# Patient Record
Sex: MALE
[De-identification: names, ages, dates, MRNs, and addresses within clinical notes are randomized per-mention and may not be internally consistent; named-entity substitution may affect disease eponyms.]

---

## 2017-07-26 ENCOUNTER — APPOINTMENT (OUTPATIENT)
Dept: CARDIOLOGY | Facility: CLINIC | Age: 57
End: 2017-07-26
Payer: COMMERCIAL

## 2017-07-26 ENCOUNTER — APPOINTMENT (OUTPATIENT)
Dept: CARDIOLOGY | Facility: CLINIC | Age: 57
End: 2017-07-26

## 2017-07-26 ENCOUNTER — NON-APPOINTMENT (OUTPATIENT)
Age: 57
End: 2017-07-26

## 2017-07-26 VITALS
BODY MASS INDEX: 20.86 KG/M2 | WEIGHT: 154 LBS | RESPIRATION RATE: 16 BRPM | HEIGHT: 72 IN | HEART RATE: 87 BPM | SYSTOLIC BLOOD PRESSURE: 145 MMHG | OXYGEN SATURATION: 100 % | DIASTOLIC BLOOD PRESSURE: 93 MMHG

## 2017-07-26 DIAGNOSIS — N28.9 DISORDER OF KIDNEY AND URETER, UNSPECIFIED: ICD-10-CM

## 2017-07-26 DIAGNOSIS — F17.200 NICOTINE DEPENDENCE, UNSPECIFIED, UNCOMPLICATED: ICD-10-CM

## 2017-07-26 DIAGNOSIS — N20.0 CALCULUS OF KIDNEY: ICD-10-CM

## 2017-07-26 DIAGNOSIS — Z78.9 OTHER SPECIFIED HEALTH STATUS: ICD-10-CM

## 2017-07-26 DIAGNOSIS — M06.9 RHEUMATOID ARTHRITIS, UNSPECIFIED: ICD-10-CM

## 2017-07-26 DIAGNOSIS — I10 ESSENTIAL (PRIMARY) HYPERTENSION: ICD-10-CM

## 2017-07-26 DIAGNOSIS — I71.9 AORTIC ANEURYSM OF UNSPECIFIED SITE, W/OUT RUPTURE: ICD-10-CM

## 2017-07-26 DIAGNOSIS — I49.3 VENTRICULAR PREMATURE DEPOLARIZATION: ICD-10-CM

## 2017-07-26 DIAGNOSIS — F15.90 OTHER STIMULANT USE, UNSPECIFIED, UNCOMPLICATED: ICD-10-CM

## 2017-07-26 DIAGNOSIS — M19.90 UNSPECIFIED OSTEOARTHRITIS, UNSPECIFIED SITE: ICD-10-CM

## 2017-07-26 PROCEDURE — 93306 TTE W/DOPPLER COMPLETE: CPT

## 2017-07-26 RX ORDER — ALPRAZOLAM 0.25 MG/1
0.25 TABLET ORAL
Qty: 30 | Refills: 0 | Status: DISCONTINUED | COMMUNITY
Start: 2017-03-20

## 2017-07-26 RX ORDER — ETANERCEPT 50 MG/ML
50 SOLUTION SUBCUTANEOUS
Refills: 0 | Status: ACTIVE | COMMUNITY

## 2017-07-26 RX ORDER — MESALAMINE 1000 MG/1
1000 SUPPOSITORY RECTAL
Qty: 60 | Refills: 0 | Status: DISCONTINUED | COMMUNITY
Start: 2017-03-07

## 2017-07-26 RX ORDER — LEVOFLOXACIN 750 MG/1
750 TABLET, FILM COATED ORAL
Qty: 14 | Refills: 0 | Status: DISCONTINUED | COMMUNITY
Start: 2017-03-28

## 2017-07-31 ENCOUNTER — NON-APPOINTMENT (OUTPATIENT)
Age: 57
End: 2017-07-31

## 2018-02-07 ENCOUNTER — APPOINTMENT (OUTPATIENT)
Dept: CARDIOLOGY | Facility: CLINIC | Age: 58
End: 2018-02-07
Payer: COMMERCIAL

## 2018-02-07 PROCEDURE — 93015 CV STRESS TEST SUPVJ I&R: CPT

## 2018-07-24 PROBLEM — Z78.9 ALCOHOL USE: Status: INACTIVE | Noted: 2017-07-26

## 2020-01-22 ENCOUNTER — NON-APPOINTMENT (OUTPATIENT)
Age: 60
End: 2020-01-22

## 2020-01-22 ENCOUNTER — APPOINTMENT (OUTPATIENT)
Dept: CARDIOLOGY | Facility: CLINIC | Age: 60
End: 2020-01-22
Payer: COMMERCIAL

## 2020-01-22 VITALS
SYSTOLIC BLOOD PRESSURE: 133 MMHG | OXYGEN SATURATION: 98 % | BODY MASS INDEX: 21.26 KG/M2 | HEART RATE: 64 BPM | HEIGHT: 72 IN | RESPIRATION RATE: 16 BRPM | DIASTOLIC BLOOD PRESSURE: 88 MMHG | WEIGHT: 157 LBS

## 2020-01-22 PROCEDURE — 99213 OFFICE O/P EST LOW 20 MIN: CPT

## 2020-01-22 PROCEDURE — 93000 ELECTROCARDIOGRAM COMPLETE: CPT

## 2020-01-22 RX ORDER — LABETALOL HYDROCHLORIDE 100 MG/1
100 TABLET, FILM COATED ORAL
Qty: 60 | Refills: 3 | Status: ACTIVE | COMMUNITY
Start: 2017-07-26 | End: 1900-01-01

## 2020-01-26 NOTE — ASSESSMENT
[FreeTextEntry1] : abnormal cardiac CTA\par will update his ischemic risk with plain stress testing \par \par hyperlipidemia\par fasting lipids fasting lipids are requested in order to update his lipid risk with an eye towards continued statins \par \par aortic ectasia \par echo . would favor labetalol as an antihypertensive in view of borderline aortic dilatation in the setting Ankylosing spondylitis.\par \par medical necessity\par moderate risk encounter based upon evaluation of multiple stable cardiovascular issues

## 2020-01-26 NOTE — REASON FOR VISIT
[Follow-Up - Clinic] : a clinic follow-up of [Coronary Artery Disease] : coronary artery disease [Hyperlipidemia] : hyperlipidemia [Hypertension] : hypertension [Medication Management] : Medication management [Peripheral Vascular Disease] : peripheral vascular disease [FreeTextEntry1] : Dr Riley comes today for follow-up of aortic ectasia,  hyperlipidemia and a mildly abnormal cardiac CAT scan

## 2020-03-11 ENCOUNTER — APPOINTMENT (OUTPATIENT)
Dept: CARDIOLOGY | Facility: CLINIC | Age: 60
End: 2020-03-11
Payer: COMMERCIAL

## 2020-03-11 PROCEDURE — 93306 TTE W/DOPPLER COMPLETE: CPT

## 2020-03-11 PROCEDURE — 93015 CV STRESS TEST SUPVJ I&R: CPT

## 2020-03-13 ENCOUNTER — EMERGENCY (EMERGENCY)
Facility: HOSPITAL | Age: 60
LOS: 1 days | Discharge: HOME | End: 2020-03-13
Attending: EMERGENCY MEDICINE | Admitting: EMERGENCY MEDICINE
Payer: COMMERCIAL

## 2020-03-13 VITALS
OXYGEN SATURATION: 96 % | HEART RATE: 68 BPM | SYSTOLIC BLOOD PRESSURE: 142 MMHG | TEMPERATURE: 98 F | DIASTOLIC BLOOD PRESSURE: 89 MMHG | RESPIRATION RATE: 17 BRPM

## 2020-03-13 VITALS
SYSTOLIC BLOOD PRESSURE: 168 MMHG | WEIGHT: 154.98 LBS | TEMPERATURE: 98 F | OXYGEN SATURATION: 95 % | HEIGHT: 72 IN | RESPIRATION RATE: 18 BRPM | DIASTOLIC BLOOD PRESSURE: 117 MMHG | HEART RATE: 88 BPM

## 2020-03-13 DIAGNOSIS — R61 GENERALIZED HYPERHIDROSIS: ICD-10-CM

## 2020-03-13 LAB
ALBUMIN SERPL ELPH-MCNC: 4 G/DL — SIGNIFICANT CHANGE UP (ref 3.3–5)
ALP SERPL-CCNC: 66 U/L — SIGNIFICANT CHANGE UP (ref 40–120)
ALT FLD-CCNC: 23 U/L — SIGNIFICANT CHANGE UP (ref 10–45)
ANION GAP SERPL CALC-SCNC: 8 MMOL/L — SIGNIFICANT CHANGE UP (ref 5–17)
APPEARANCE UR: CLEAR — SIGNIFICANT CHANGE UP
AST SERPL-CCNC: 18 U/L — SIGNIFICANT CHANGE UP (ref 10–40)
BASOPHILS # BLD AUTO: 0.02 K/UL — SIGNIFICANT CHANGE UP (ref 0–0.2)
BASOPHILS NFR BLD AUTO: 0.2 % — SIGNIFICANT CHANGE UP (ref 0–2)
BILIRUB SERPL-MCNC: 0.4 MG/DL — SIGNIFICANT CHANGE UP (ref 0.2–1.2)
BILIRUB UR-MCNC: NEGATIVE — SIGNIFICANT CHANGE UP
BUN SERPL-MCNC: 22 MG/DL — SIGNIFICANT CHANGE UP (ref 7–23)
CALCIUM SERPL-MCNC: 9.2 MG/DL — SIGNIFICANT CHANGE UP (ref 8.4–10.5)
CHLORIDE SERPL-SCNC: 104 MMOL/L — SIGNIFICANT CHANGE UP (ref 96–108)
CO2 SERPL-SCNC: 30 MMOL/L — SIGNIFICANT CHANGE UP (ref 22–31)
COLOR SPEC: YELLOW — SIGNIFICANT CHANGE UP
CREAT SERPL-MCNC: 1.02 MG/DL — SIGNIFICANT CHANGE UP (ref 0.5–1.3)
DIFF PNL FLD: NEGATIVE — SIGNIFICANT CHANGE UP
EOSINOPHIL # BLD AUTO: 0.19 K/UL — SIGNIFICANT CHANGE UP (ref 0–0.5)
EOSINOPHIL NFR BLD AUTO: 2.3 % — SIGNIFICANT CHANGE UP (ref 0–6)
FLU A RESULT: SIGNIFICANT CHANGE UP
FLU A RESULT: SIGNIFICANT CHANGE UP
FLUAV AG NPH QL: SIGNIFICANT CHANGE UP
FLUBV AG NPH QL: SIGNIFICANT CHANGE UP
GLUCOSE SERPL-MCNC: 115 MG/DL — HIGH (ref 70–99)
GLUCOSE UR QL: NEGATIVE — SIGNIFICANT CHANGE UP
HCT VFR BLD CALC: 38 % — LOW (ref 39–50)
HGB BLD-MCNC: 12.9 G/DL — LOW (ref 13–17)
IMM GRANULOCYTES NFR BLD AUTO: 0.4 % — SIGNIFICANT CHANGE UP (ref 0–1.5)
KETONES UR-MCNC: NEGATIVE — SIGNIFICANT CHANGE UP
LEUKOCYTE ESTERASE UR-ACNC: NEGATIVE — SIGNIFICANT CHANGE UP
LYMPHOCYTES # BLD AUTO: 0.89 K/UL — LOW (ref 1–3.3)
LYMPHOCYTES # BLD AUTO: 10.6 % — LOW (ref 13–44)
MAGNESIUM SERPL-MCNC: 2.3 MG/DL — SIGNIFICANT CHANGE UP (ref 1.6–2.6)
MCHC RBC-ENTMCNC: 29.9 PG — SIGNIFICANT CHANGE UP (ref 27–34)
MCHC RBC-ENTMCNC: 33.9 GM/DL — SIGNIFICANT CHANGE UP (ref 32–36)
MCV RBC AUTO: 88 FL — SIGNIFICANT CHANGE UP (ref 80–100)
MONOCYTES # BLD AUTO: 0.49 K/UL — SIGNIFICANT CHANGE UP (ref 0–0.9)
MONOCYTES NFR BLD AUTO: 5.8 % — SIGNIFICANT CHANGE UP (ref 2–14)
NEUTROPHILS # BLD AUTO: 6.76 K/UL — SIGNIFICANT CHANGE UP (ref 1.8–7.4)
NEUTROPHILS NFR BLD AUTO: 80.7 % — HIGH (ref 43–77)
NITRITE UR-MCNC: NEGATIVE — SIGNIFICANT CHANGE UP
NRBC # BLD: 0 /100 WBCS — SIGNIFICANT CHANGE UP (ref 0–0)
PH UR: 7 — SIGNIFICANT CHANGE UP (ref 5–8)
PLATELET # BLD AUTO: 190 K/UL — SIGNIFICANT CHANGE UP (ref 150–400)
POTASSIUM SERPL-MCNC: 4.3 MMOL/L — SIGNIFICANT CHANGE UP (ref 3.5–5.3)
POTASSIUM SERPL-SCNC: 4.3 MMOL/L — SIGNIFICANT CHANGE UP (ref 3.5–5.3)
PROT SERPL-MCNC: 7.5 G/DL — SIGNIFICANT CHANGE UP (ref 6–8.3)
PROT UR-MCNC: NEGATIVE — SIGNIFICANT CHANGE UP
RBC # BLD: 4.32 M/UL — SIGNIFICANT CHANGE UP (ref 4.2–5.8)
RBC # FLD: 13.2 % — SIGNIFICANT CHANGE UP (ref 10.3–14.5)
RSV RESULT: SIGNIFICANT CHANGE UP
RSV RNA RESP QL NAA+PROBE: SIGNIFICANT CHANGE UP
SODIUM SERPL-SCNC: 142 MMOL/L — SIGNIFICANT CHANGE UP (ref 135–145)
SP GR SPEC: 1.01 — SIGNIFICANT CHANGE UP (ref 1.01–1.02)
UROBILINOGEN FLD QL: NEGATIVE — SIGNIFICANT CHANGE UP
WBC # BLD: 8.38 K/UL — SIGNIFICANT CHANGE UP (ref 3.8–10.5)
WBC # FLD AUTO: 8.38 K/UL — SIGNIFICANT CHANGE UP (ref 3.8–10.5)

## 2020-03-13 PROCEDURE — 99283 EMERGENCY DEPT VISIT LOW MDM: CPT | Mod: 25

## 2020-03-13 PROCEDURE — 87581 M.PNEUMON DNA AMP PROBE: CPT

## 2020-03-13 PROCEDURE — 36415 COLL VENOUS BLD VENIPUNCTURE: CPT

## 2020-03-13 PROCEDURE — 87486 CHLMYD PNEUM DNA AMP PROBE: CPT

## 2020-03-13 PROCEDURE — 83735 ASSAY OF MAGNESIUM: CPT

## 2020-03-13 PROCEDURE — 80053 COMPREHEN METABOLIC PANEL: CPT

## 2020-03-13 PROCEDURE — 85027 COMPLETE CBC AUTOMATED: CPT

## 2020-03-13 PROCEDURE — 71046 X-RAY EXAM CHEST 2 VIEWS: CPT | Mod: 26

## 2020-03-13 PROCEDURE — 87633 RESP VIRUS 12-25 TARGETS: CPT

## 2020-03-13 PROCEDURE — 87798 DETECT AGENT NOS DNA AMP: CPT

## 2020-03-13 PROCEDURE — 93010 ELECTROCARDIOGRAM REPORT: CPT

## 2020-03-13 PROCEDURE — 99284 EMERGENCY DEPT VISIT MOD MDM: CPT

## 2020-03-13 PROCEDURE — 71046 X-RAY EXAM CHEST 2 VIEWS: CPT

## 2020-03-13 PROCEDURE — 87631 RESP VIRUS 3-5 TARGETS: CPT

## 2020-03-13 PROCEDURE — 93005 ELECTROCARDIOGRAM TRACING: CPT

## 2020-03-13 RX ORDER — SODIUM CHLORIDE 9 MG/ML
1000 INJECTION INTRAMUSCULAR; INTRAVENOUS; SUBCUTANEOUS ONCE
Refills: 0 | Status: COMPLETED | OUTPATIENT
Start: 2020-03-13 | End: 2020-03-13

## 2020-03-13 RX ADMIN — SODIUM CHLORIDE 1000 MILLILITER(S): 9 INJECTION INTRAMUSCULAR; INTRAVENOUS; SUBCUTANEOUS at 08:44

## 2020-03-13 NOTE — ED PROVIDER NOTE - ADDITIONAL NOTES AND INSTRUCTIONS:
You may return to work when fever free for at least 24 hours without the use of Ibuprofen or Acetaminophen. Thank you.

## 2020-03-13 NOTE — ED PROVIDER NOTE - OBJECTIVE STATEMENT
60M with PMH of HTN and Ankylosing Spondylitis (on Enbrel) presents to the ED with reports of subjective fevers and diaphoresis overnight. Patient notes that while asleep, he broke into a profuse sweat that soaked his sheets. He didn't take his temperature but this AM, he got up to come in to work, wore a mask, and it happened again, where he broke into a flushed feeling and sweat. No cough or flu like symptoms currently. No chest pain or SOB. No abd pain, nausea, vomiting or diarrhea. He notes that about 1 week ago, he had flu-like symptoms. He self medicated with Levaquin. He had a cough during that time but it has since resolved. He also stopped his Enbrel due to the flu-like illness he was experiencing. He saw his cardiologist, where an echo was performed early this week and deemed normal. He is on Labetalol, which he took this AM.   In light of recent events, pt has no known exposure to patient who has been positive for COVID-19. He has no current respiratory symptoms. He has no recent travel to the [place].

## 2020-03-13 NOTE — ED PROVIDER NOTE - PATIENT PORTAL LINK FT
You can access the FollowMyHealth Patient Portal offered by [facility] by registering at the following website: [email or web address]. By joining [facility]’s FollowMyHealth portal, you will also be able to view your health information using other applications (apps) compatible with our system.

## 2020-03-13 NOTE — ED PROVIDER NOTE - CARE PROVIDER_API CALL
Ross Alves (DO)  Internal Medicine  [address] [id number]  Phone: [phone]  Fax: [phone]  Follow Up Time:

## 2020-03-13 NOTE — ED ADULT NURSE NOTE - OBJECTIVE STATEMENT
60 yr old male A&OX3 ambulatory to ED c/o sudden onset of diaphoresis/chills. Pt denies travel, shortness of breath, chest pain. fever, nausea, vomiting, cough, diarrhea. Pt afebrile, lungs clear bilaterally. PMHX: Kidney stones, HTN

## 2020-03-13 NOTE — ED PROVIDER NOTE - CLINICAL SUMMARY MEDICAL DECISION MAKING FREE TEXT BOX
60M with PMH of HTN and Ankylosing Spondylitis (on Enbrel) presents to the ED with reports of subjective fevers and diaphoresis overnight. Patient notes that while asleep, he broke into a profuse sweat that soaked his sheets. He didn't take his temperature but this AM, he got up to come in to work, wore a mask, and it happened again, where he broke into a flushed feeling and sweat. No cough or flu like symptoms currently. No chest pain or SOB. No abd pain, nausea, vomiting or diarrhea. He notes that about 1 week ago, he had flu-like symptoms. He self medicated with Levaquin. He had a cough during that time but it has since resolved. He also stopped his Enbrel due to the flu-like illness he was experiencing. He saw his cardiologist, where an echo was performed early this week and deemed normal. He is on Labetalol, which he took this AM.   In light of recent events, pt has no known exposure to patient who has been positive for COVID-19. He has no current respiratory symptoms. He has no recent travel to the [place] of interest.   Exam WNL. Will check EKG, Monitor BP, Check labs (CBC CMP), UA (h/o stones), and Sent Flu and RVP. I will complete [employer] Tracking tool, although no known exposure due to occupation and chance of unknown exposure. At this time, pt does not meet criteria for covid testing and we discussed this at length. All questions answered. 60M with PMH of HTN and Ankylosing Spondylitis (on Enbrel) presents to the ED with reports of subjective fevers and diaphoresis overnight. Patient notes that while asleep, he broke into a profuse sweat that soaked his sheets. He didn't take his temperature but this AM, he got up to come in to work, wore a mask, and it happened again, where he broke into a flushed feeling and sweat. No cough or flu like symptoms currently. No chest pain or SOB. No abd pain, nausea, vomiting or diarrhea. He notes that about 1 week ago, he had flu-like symptoms. He self medicated with Levaquin. He had a cough during that time but it has since resolved. He also stopped his Enbrel due to the flu-like illness he was experiencing. He saw his cardiologist, where an echo was performed early this week and deemed normal. He is on Labetalol, which he took this AM.   In light of recent events, pt has no known exposure to patient who has been positive for COVID-19. He has no current respiratory symptoms. He has no recent travel to the [place].   Exam WNL. Will check EKG, Monitor BP, Check labs (CBC CMP), UA (h/o stones), and Sent Flu and RVP. I will complete [employer] Tracking tool, although no known exposure due to occupation and chance of unknown exposure. At this time, pt does not meet criteria for covid testing and we discussed this at length. All questions answered.  All results WNL. Pt states he feels 100% improved. Vitals normal. Stable for D/C. Informed of outstanding test (RVP) that will be pending post discharge. Advised that if Worsening, continued or ANY new concerning symptoms return to the emergency department.

## 2020-03-13 NOTE — ED PROVIDER NOTE - PMH
Ankylosing spondylitis, unspecified site of spine    Hypertension, unspecified type    Kidney stones

## 2020-03-13 NOTE — ED PROVIDER NOTE - NSFOLLOWUPINSTRUCTIONS_ED_ALL_ED_FT
During your stay, you asked if we offer the test for COVID-19. Hopefully, we answered your question adequately. If further questions come up, see the attached fact sheet. Also, [facility] has set up a call center to answer questions. The phone number is [phone] - Press Option #3 for patients.     We have tested for Influenza A, B, and RSV, which are negative as resulted today. We have sent a Respiratory Viral Panel which is a more in depth testing for viral illnesses such as the usual strains of influenza, rhinovirus, enterovirus, parainfluenza, coronavirus and more. If any such result were to return positive, we would call you to inform you of the finding. Also, we have entered your info and presentation into the [facility] tracking database called [employer] for patient tracking in case any new developments arise and/or we need to contact you.     Meanwhile, since you have only symptom of feeling feverish, it is advised that you monitor your temperature and observe for progression of symptoms. These symptoms may be urinary, respiratory, cardiac, GI, etc. Worsening, continued or ANY new concerning symptoms return to the emergency department.     You should remain home from work until you are fever free for at least 24 hours. This is standard. If there are any additional concerns, contact your primary physician.    Drink plenty of fluids. Rest. Take Tylenol 650mg every 6 hours or Ibuprofen 600mg every 6 hours as needed for fever or pain.       Thank you.

## 2020-03-14 LAB — RAPID RVP RESULT: SIGNIFICANT CHANGE UP

## 2020-03-14 RX ORDER — SIMVASTATIN 5 MG/1
5 TABLET, FILM COATED ORAL
Refills: 0 | Status: DISCONTINUED | COMMUNITY
End: 2020-03-14

## 2020-03-14 RX ORDER — NEBIVOLOL HYDROCHLORIDE 10 MG/1
10 TABLET ORAL
Qty: 30 | Refills: 2 | Status: DISCONTINUED | COMMUNITY
Start: 2018-02-02 | End: 2020-03-14

## 2020-03-17 ENCOUNTER — OUTPATIENT (OUTPATIENT)
Dept: OUTPATIENT SERVICES | Facility: HOSPITAL | Age: 60
LOS: 1 days | End: 2020-03-17
Payer: COMMERCIAL

## 2020-03-17 ENCOUNTER — APPOINTMENT (OUTPATIENT)
Dept: CT IMAGING | Facility: HOSPITAL | Age: 60
End: 2020-03-17
Payer: COMMERCIAL

## 2020-03-17 DIAGNOSIS — Z00.8 ENCOUNTER FOR OTHER GENERAL EXAMINATION: ICD-10-CM

## 2020-03-17 PROBLEM — N20.0 CALCULUS OF KIDNEY: Chronic | Status: ACTIVE | Noted: 2020-03-13

## 2020-03-17 PROBLEM — I10 ESSENTIAL (PRIMARY) HYPERTENSION: Chronic | Status: ACTIVE | Noted: 2020-03-13

## 2020-03-17 PROBLEM — M45.9 ANKYLOSING SPONDYLITIS OF UNSPECIFIED SITES IN SPINE: Chronic | Status: ACTIVE | Noted: 2020-03-13

## 2020-03-17 PROCEDURE — 71260 CT THORAX DX C+: CPT | Mod: 26

## 2020-03-17 PROCEDURE — 71260 CT THORAX DX C+: CPT

## 2020-03-17 PROCEDURE — 74177 CT ABD & PELVIS W/CONTRAST: CPT

## 2020-03-17 PROCEDURE — 74177 CT ABD & PELVIS W/CONTRAST: CPT | Mod: 26

## 2020-03-25 ENCOUNTER — APPOINTMENT (OUTPATIENT)
Dept: ALLERGY | Facility: CLINIC | Age: 60
End: 2020-03-25
Payer: COMMERCIAL

## 2020-03-25 VITALS
BODY MASS INDEX: 21.26 KG/M2 | DIASTOLIC BLOOD PRESSURE: 84 MMHG | WEIGHT: 157 LBS | OXYGEN SATURATION: 98 % | HEART RATE: 80 BPM | SYSTOLIC BLOOD PRESSURE: 132 MMHG | HEIGHT: 72 IN

## 2020-03-25 PROCEDURE — 99204 OFFICE O/P NEW MOD 45 MIN: CPT

## 2020-03-25 RX ORDER — FINASTERIDE 5 MG/1
TABLET, FILM COATED ORAL
Refills: 0 | Status: DISCONTINUED | COMMUNITY
End: 2020-03-25

## 2020-03-25 NOTE — HISTORY OF PRESENT ILLNESS
[Asthma] : asthma [Allergic Rhinitis] : allergic rhinitis [Eczematous rashes] : eczematous rashes [Venom Reactions] : venom reactions [Food Allergies] : food allergies [de-identified] : Patient has history of ankylosis spondylitis diagnosed over 30 years ago - treated with Enbrel x 10 years and discontinued about one month ago.   About 2 weeks ago he was experiencing night sweats - with no associated chills - loss of appetite or weight loss.  He had negative flu testing.   He then had a CT of chest, abdomen and pelvis which was normal.   His sweating has lessened then it was at his peak.   He had a negative PPD performed 6 weeks ago.   He had additional blood work for TB.    No associated fever or cough.  \par \par He recently had QUIGs performed because of his concern about lymphoma.   He has URI 3x per years - clears without treatment.   CXR documented pneumonia in 2018 - treated as an outpatient - cleared on follow up CXR.   \par \par Genetic markers are negative for ankylosis spondylitis.   Mother/brother hypothyroid.   Three children - no autoimmune problems.

## 2020-03-25 NOTE — PHYSICAL EXAM
[Alert] : alert [Well Nourished] : well nourished [Healthy Appearance] : healthy appearance [No Acute Distress] : no acute distress [Well Developed] : well developed [Normal Pupil & Iris Size/Symmetry] : normal pupil and iris size and symmetry [No Discharge] : no discharge [No Photophobia] : no photophobia [Sclera Not Icteric] : sclera not icteric [Normal Lips/Tongue] : the lips and tongue were normal [Normal Tonsils] : normal tonsils [No Oral Lesions or Ulcers] : no oral lesions or ulcers [Boggy Nasal Turbinates] : no boggy and/or pale nasal turbinates [No Neck Mass] : no neck mass was observed [No LAD] : no lymphadenopathy [No Thyroid Mass] : no thyroid mass [Supple] : the neck was supple [Normal Rate and Effort] : normal respiratory rhythm and effort [Normal Palpation] : palpation of the chest revealed no abnormalities [No Crackles] : no crackles [Bilateral Audible Breath Sounds] : bilateral audible breath sounds [Wheezing] : no wheezing was heard [Normal Rate] : heart rate was normal  [Normal S1, S2] : normal S1 and S2 [No murmur] : no murmur [Regular Rhythm] : with a regular rhythm [Normal Cervical Lymph Nodes] : cervical [Skin Intact] : skin intact  [No Rash] : no rash [No Skin Lesions] : no skin lesions [No Joint Swelling or Erythema] : no joint swelling or erythema [No clubbing] : no clubbing [No Edema] : no edema [No Cyanosis] : no cyanosis [Normal Mood] : mood was normal [Normal Affect] : affect was normal [Alert, Awake, Oriented as Age-Appropriate] : alert, awake, oriented as age appropriate

## 2020-03-25 NOTE — ASSESSMENT
[FreeTextEntry1] : IgA deficiency \par Ankylosis spondylitis\par \par I have advised Dr. Riley that the frequency of IgA deficiency is 1/600 and may associated with other immunodeficiencies therefore further laboratory testing is necessary.   In addition, there is an association with autoimmune disorders and further testing will be performed as well.

## 2020-03-25 NOTE — SOCIAL HISTORY
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

## 2020-03-26 LAB
CHOLEST SERPL-MCNC: 204 MG/DL
CHOLEST/HDLC SERPL: 4.3 RATIO
CK SERPL-CCNC: 42 U/L
HDLC SERPL-MCNC: 47 MG/DL
LDLC SERPL CALC-MCNC: 141 MG/DL
TRIGL SERPL-MCNC: 79 MG/DL

## 2020-03-27 LAB
ALBUMIN SERPL ELPH-MCNC: 5 G/DL
ALP BLD-CCNC: 73 U/L
ALT SERPL-CCNC: 12 U/L
ANION GAP SERPL CALC-SCNC: 15 MMOL/L
AST SERPL-CCNC: 15 U/L
BILIRUB SERPL-MCNC: 0.6 MG/DL
BUN SERPL-MCNC: 13 MG/DL
CALCIUM SERPL-MCNC: 10 MG/DL
CHLORIDE SERPL-SCNC: 96 MMOL/L
CO2 SERPL-SCNC: 29 MMOL/L
CREAT SERPL-MCNC: 0.9 MG/DL
GLUCOSE SERPL-MCNC: 118 MG/DL
POTASSIUM SERPL-SCNC: 4.4 MMOL/L
PROT SERPL-MCNC: 7.7 G/DL
SODIUM SERPL-SCNC: 140 MMOL/L

## 2020-03-31 DIAGNOSIS — R61 GENERALIZED HYPERHIDROSIS: ICD-10-CM

## 2020-04-04 LAB
ANA SER IF-ACNC: NEGATIVE
C DIPHTHERIAE AB SER QL: 0.43 IU/ML
C TETANI IGG SER-ACNC: 1.52 IU/ML
CD16+CD56+ CELLS # BLD: 181 /UL
CD16+CD56+ CELLS NFR BLD: 19 %
CD19 CELLS NFR BLD: 170 /UL
CD3 CELLS # BLD: 596 /UL
CD3 CELLS NFR BLD: 64 %
CD3+CD4+ CELLS # BLD: 367 /UL
CD3+CD4+ CELLS NFR BLD: 41 %
CD3+CD4+ CELLS/CD3+CD8+ CLL SPEC: 2.77 RATIO
CD3+CD8+ CELLS # SPEC: 132 /UL
CD3+CD8+ CELLS NFR BLD: 15 %
CELLS.CD3-CD19+/CELLS IN BLOOD: 18 %
CRP SERPL-MCNC: <0.1 MG/DL
DEPRECATED S PNEUM 1 IGG SER-MCNC: 17.2 MCG/ML
DEPRECATED S PNEUM12 AB SER-ACNC: <0.4 MCG/ML
DEPRECATED S PNEUM14 AB SER-ACNC: 1 MCG/ML
DEPRECATED S PNEUM17 IGG SER IA-MCNC: 0.8 MCG/ML
DEPRECATED S PNEUM18 IGG SER IA-MCNC: 0.4 MCG/ML
DEPRECATED S PNEUM19 IGG SER-MCNC: 11.5 MCG/ML
DEPRECATED S PNEUM19 IGG SER-MCNC: 21.9 MCG/ML
DEPRECATED S PNEUM2 IGG SER-MCNC: 0.9 MCG/ML
DEPRECATED S PNEUM20 IGG SER-MCNC: 1.7 MCG/ML
DEPRECATED S PNEUM22 IGG SER-MCNC: 17.5 MCG/ML
DEPRECATED S PNEUM23 AB SER-ACNC: 19 MCG/ML
DEPRECATED S PNEUM3 AB SER-ACNC: <0.4 MCG/ML
DEPRECATED S PNEUM34 IGG SER-MCNC: 0.9 MCG/ML
DEPRECATED S PNEUM4 AB SER-ACNC: <0.4 MCG/ML
DEPRECATED S PNEUM5 IGG SER-MCNC: 4.8 MCG/ML
DEPRECATED S PNEUM6 IGG SER-MCNC: 5 MCG/ML
DEPRECATED S PNEUM7 IGG SER-ACNC: 9.9 MCG/ML
DEPRECATED S PNEUM8 AB SER-ACNC: 0.4 MCG/ML
DEPRECATED S PNEUM9 AB SER-ACNC: 0.5 MCG/ML
DEPRECATED S PNEUM9 IGG SER-MCNC: 1 MCG/ML
IGG SUBSET TOTAL IGG: 1337 MG/DL
IGG1 SER-MCNC: 736 MG/DL
IGG2 SER-MCNC: 497 MG/DL
IGG3 SER-MCNC: 118 MG/DL
IGG4 SER-MCNC: 2 MG/DL
RHEUMATOID FACT SER QL: <10 IU/ML
STREPTOCOCCUS PNEUMONIAE SEROTYPE 11A: 0.7 MCG/ML
STREPTOCOCCUS PNEUMONIAE SEROTYPE 15B: 1.4 MCG/ML
STREPTOCOCCUS PNEUMONIAE SEROTYPE 33F: 1.1 MCG/ML

## 2020-04-08 ENCOUNTER — APPOINTMENT (OUTPATIENT)
Dept: ALLERGY | Facility: CLINIC | Age: 60
End: 2020-04-08
Payer: COMMERCIAL

## 2020-04-08 PROCEDURE — 99213 OFFICE O/P EST LOW 20 MIN: CPT | Mod: 95

## 2020-04-09 NOTE — DATA REVIEWED
[FreeTextEntry1] : I reviewed lab data with patient and advised him that he has protective antibodies directed towards DT and 10/23 pneumococcal serotypes.

## 2020-04-09 NOTE — HISTORY OF PRESENT ILLNESS
[Home] : at home, [unfilled] , at the time of the visit. [Medical Office: ([facility])___] : at ~his/her~ medical office located in [place] [Patient] : the patient [FreeTextEntry2] : Justin Riley  [de-identified] : Patient had tested positive to corona virus and ID felt this would explain his night sweats - his symptoms have lessened slightly since the onset - no associated fever or coughing.

## 2020-04-09 NOTE — REASON FOR VISIT
[Routine Follow-Up] : a routine follow-up visit for [FreeTextEntry3] : follow up of immunodeficiency and lab review

## 2020-04-09 NOTE — ASSESSMENT
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 \par \par No treatment necessary at this time\par IgA deficient donor blood transfusion if indicated in the future\par \par Signs and symptoms of corona virus - \par \par Patient will follow up with PCP regarding persistent night sweats \par \par This visit was provided via telehealth using real time 2-way audio visual technology.  The patient, Justin Riley , was located at home, at the time of the visit.   The provider, Mitchell Boxer, M.D., was located at the office, [address] [place] [address], at the time of the visit.\par \par The patient, Justin Riley and physician, Mitchell Boxer, M.D., participated in the telehealth encounter.\par \par Verbal consent obtained by  from patient.\par \par The majority of time (>50%) was spent on counseling and coordination of care with this patient and/or family member.  The approximate physician face to face time was 15 minutes for the diagnosis of IgA deficiency. \par \par

## 2020-04-13 LAB — C2 SERPL-MCNC: 2.4 MG/DL

## 2022-01-31 ENCOUNTER — APPOINTMENT (OUTPATIENT)
Dept: NEUROLOGY | Facility: CLINIC | Age: 62
End: 2022-01-31
Payer: COMMERCIAL

## 2022-01-31 ENCOUNTER — EMERGENCY (EMERGENCY)
Facility: HOSPITAL | Age: 62
LOS: 1 days | Discharge: HOME | End: 2022-01-31
Attending: EMERGENCY MEDICINE | Admitting: EMERGENCY MEDICINE
Payer: COMMERCIAL

## 2022-01-31 VITALS
HEART RATE: 76 BPM | SYSTOLIC BLOOD PRESSURE: 128 MMHG | BODY MASS INDEX: 20.32 KG/M2 | HEIGHT: 72 IN | DIASTOLIC BLOOD PRESSURE: 76 MMHG | WEIGHT: 150 LBS

## 2022-01-31 VITALS
DIASTOLIC BLOOD PRESSURE: 90 MMHG | RESPIRATION RATE: 16 BRPM | OXYGEN SATURATION: 100 % | HEART RATE: 67 BPM | SYSTOLIC BLOOD PRESSURE: 137 MMHG

## 2022-01-31 VITALS
RESPIRATION RATE: 18 BRPM | HEART RATE: 72 BPM | SYSTOLIC BLOOD PRESSURE: 161 MMHG | HEIGHT: 72 IN | TEMPERATURE: 97 F | WEIGHT: 127.87 LBS | DIASTOLIC BLOOD PRESSURE: 99 MMHG | OXYGEN SATURATION: 98 %

## 2022-01-31 VITALS
BODY MASS INDEX: 20.32 KG/M2 | DIASTOLIC BLOOD PRESSURE: 76 MMHG | SYSTOLIC BLOOD PRESSURE: 128 MMHG | HEART RATE: 76 BPM | HEIGHT: 72 IN | WEIGHT: 150 LBS

## 2022-01-31 DIAGNOSIS — Z87.898 PERSONAL HISTORY OF OTHER SPECIFIED CONDITIONS: ICD-10-CM

## 2022-01-31 DIAGNOSIS — R26.89 OTHER ABNORMALITIES OF GAIT AND MOBILITY: ICD-10-CM

## 2022-01-31 DIAGNOSIS — R42 DIZZINESS AND GIDDINESS: ICD-10-CM

## 2022-01-31 LAB
ALBUMIN SERPL ELPH-MCNC: 4.1 G/DL — SIGNIFICANT CHANGE UP (ref 3.3–5)
ALP SERPL-CCNC: 70 U/L — SIGNIFICANT CHANGE UP (ref 40–120)
ALT FLD-CCNC: 23 U/L — SIGNIFICANT CHANGE UP (ref 10–45)
ANION GAP SERPL CALC-SCNC: 5 MMOL/L — SIGNIFICANT CHANGE UP (ref 5–17)
APPEARANCE UR: CLEAR — SIGNIFICANT CHANGE UP
APTT BLD: 21.8 SEC — LOW (ref 27.5–35.5)
AST SERPL-CCNC: 21 U/L — SIGNIFICANT CHANGE UP (ref 10–40)
BACTERIA # UR AUTO: NEGATIVE /HPF — SIGNIFICANT CHANGE UP
BASOPHILS # BLD AUTO: 0.03 K/UL — SIGNIFICANT CHANGE UP (ref 0–0.2)
BASOPHILS NFR BLD AUTO: 0.4 % — SIGNIFICANT CHANGE UP (ref 0–2)
BILIRUB SERPL-MCNC: 0.7 MG/DL — SIGNIFICANT CHANGE UP (ref 0.2–1.2)
BILIRUB UR-MCNC: NEGATIVE — SIGNIFICANT CHANGE UP
BUN SERPL-MCNC: 17 MG/DL — SIGNIFICANT CHANGE UP (ref 7–23)
CALCIUM SERPL-MCNC: 9 MG/DL — SIGNIFICANT CHANGE UP (ref 8.4–10.5)
CHLORIDE SERPL-SCNC: 102 MMOL/L — SIGNIFICANT CHANGE UP (ref 96–108)
CO2 SERPL-SCNC: 30 MMOL/L — SIGNIFICANT CHANGE UP (ref 22–31)
COLOR SPEC: YELLOW — SIGNIFICANT CHANGE UP
CREAT SERPL-MCNC: 0.94 MG/DL — SIGNIFICANT CHANGE UP (ref 0.5–1.3)
DIFF PNL FLD: ABNORMAL
EOSINOPHIL # BLD AUTO: 0.19 K/UL — SIGNIFICANT CHANGE UP (ref 0–0.5)
EOSINOPHIL NFR BLD AUTO: 2.4 % — SIGNIFICANT CHANGE UP (ref 0–6)
EPI CELLS # UR: SIGNIFICANT CHANGE UP
FLUAV AG NPH QL: SIGNIFICANT CHANGE UP
FLUBV AG NPH QL: SIGNIFICANT CHANGE UP
GLUCOSE SERPL-MCNC: 121 MG/DL — HIGH (ref 70–99)
GLUCOSE UR QL: NEGATIVE — SIGNIFICANT CHANGE UP
HCT VFR BLD CALC: 40.3 % — SIGNIFICANT CHANGE UP (ref 39–50)
HGB BLD-MCNC: 14 G/DL — SIGNIFICANT CHANGE UP (ref 13–17)
IMM GRANULOCYTES NFR BLD AUTO: 0.3 % — SIGNIFICANT CHANGE UP (ref 0–1.5)
INR BLD: 0.99 RATIO — SIGNIFICANT CHANGE UP (ref 0.88–1.16)
KETONES UR-MCNC: NEGATIVE — SIGNIFICANT CHANGE UP
LEUKOCYTE ESTERASE UR-ACNC: NEGATIVE — SIGNIFICANT CHANGE UP
LYMPHOCYTES # BLD AUTO: 1.3 K/UL — SIGNIFICANT CHANGE UP (ref 1–3.3)
LYMPHOCYTES # BLD AUTO: 16.4 % — SIGNIFICANT CHANGE UP (ref 13–44)
MCHC RBC-ENTMCNC: 30.1 PG — SIGNIFICANT CHANGE UP (ref 27–34)
MCHC RBC-ENTMCNC: 34.7 GM/DL — SIGNIFICANT CHANGE UP (ref 32–36)
MCV RBC AUTO: 86.7 FL — SIGNIFICANT CHANGE UP (ref 80–100)
MONOCYTES # BLD AUTO: 0.58 K/UL — SIGNIFICANT CHANGE UP (ref 0–0.9)
MONOCYTES NFR BLD AUTO: 7.3 % — SIGNIFICANT CHANGE UP (ref 2–14)
NEUTROPHILS # BLD AUTO: 5.82 K/UL — SIGNIFICANT CHANGE UP (ref 1.8–7.4)
NEUTROPHILS NFR BLD AUTO: 73.2 % — SIGNIFICANT CHANGE UP (ref 43–77)
NITRITE UR-MCNC: NEGATIVE — SIGNIFICANT CHANGE UP
NRBC # BLD: 0 /100 WBCS — SIGNIFICANT CHANGE UP (ref 0–0)
PH UR: 7 — SIGNIFICANT CHANGE UP (ref 5–8)
PLATELET # BLD AUTO: 210 K/UL — SIGNIFICANT CHANGE UP (ref 150–400)
POTASSIUM SERPL-MCNC: 4.1 MMOL/L — SIGNIFICANT CHANGE UP (ref 3.5–5.3)
POTASSIUM SERPL-SCNC: 4.1 MMOL/L — SIGNIFICANT CHANGE UP (ref 3.5–5.3)
PROT SERPL-MCNC: 7.7 G/DL — SIGNIFICANT CHANGE UP (ref 6–8.3)
PROT UR-MCNC: NEGATIVE — SIGNIFICANT CHANGE UP
PROTHROM AB SERPL-ACNC: 12 SEC — SIGNIFICANT CHANGE UP (ref 10.6–13.6)
RBC # BLD: 4.65 M/UL — SIGNIFICANT CHANGE UP (ref 4.2–5.8)
RBC # FLD: 12.9 % — SIGNIFICANT CHANGE UP (ref 10.3–14.5)
RBC CASTS # UR COMP ASSIST: SIGNIFICANT CHANGE UP /HPF (ref 0–4)
RSV RNA NPH QL NAA+NON-PROBE: SIGNIFICANT CHANGE UP
SARS-COV-2 RNA SPEC QL NAA+PROBE: SIGNIFICANT CHANGE UP
SODIUM SERPL-SCNC: 137 MMOL/L — SIGNIFICANT CHANGE UP (ref 135–145)
SP GR SPEC: 1.01 — SIGNIFICANT CHANGE UP (ref 1.01–1.02)
TROPONIN I, HIGH SENSITIVITY RESULT: 6 NG/L — SIGNIFICANT CHANGE UP
TROPONIN I, HIGH SENSITIVITY RESULT: 6.4 NG/L — SIGNIFICANT CHANGE UP
UROBILINOGEN FLD QL: NEGATIVE — SIGNIFICANT CHANGE UP
WBC # BLD: 7.94 K/UL — SIGNIFICANT CHANGE UP (ref 3.8–10.5)
WBC # FLD AUTO: 7.94 K/UL — SIGNIFICANT CHANGE UP (ref 3.8–10.5)
WBC UR QL: NEGATIVE /HPF — SIGNIFICANT CHANGE UP (ref 0–5)

## 2022-01-31 PROCEDURE — 74176 CT ABD & PELVIS W/O CONTRAST: CPT | Mod: MA

## 2022-01-31 PROCEDURE — 70496 CT ANGIOGRAPHY HEAD: CPT | Mod: 26,MA

## 2022-01-31 PROCEDURE — 74176 CT ABD & PELVIS W/O CONTRAST: CPT | Mod: 26,MA

## 2022-01-31 PROCEDURE — 84484 ASSAY OF TROPONIN QUANT: CPT

## 2022-01-31 PROCEDURE — 99072 ADDL SUPL MATRL&STAF TM PHE: CPT

## 2022-01-31 PROCEDURE — 99285 EMERGENCY DEPT VISIT HI MDM: CPT | Mod: 25

## 2022-01-31 PROCEDURE — 70498 CT ANGIOGRAPHY NECK: CPT | Mod: MA

## 2022-01-31 PROCEDURE — 85610 PROTHROMBIN TIME: CPT

## 2022-01-31 PROCEDURE — 70498 CT ANGIOGRAPHY NECK: CPT | Mod: 26,MA

## 2022-01-31 PROCEDURE — 87637 SARSCOV2&INF A&B&RSV AMP PRB: CPT

## 2022-01-31 PROCEDURE — 93010 ELECTROCARDIOGRAM REPORT: CPT

## 2022-01-31 PROCEDURE — 0042T: CPT

## 2022-01-31 PROCEDURE — 81001 URINALYSIS AUTO W/SCOPE: CPT

## 2022-01-31 PROCEDURE — 85025 COMPLETE CBC W/AUTO DIFF WBC: CPT

## 2022-01-31 PROCEDURE — 99204 OFFICE O/P NEW MOD 45 MIN: CPT

## 2022-01-31 PROCEDURE — 85730 THROMBOPLASTIN TIME PARTIAL: CPT

## 2022-01-31 PROCEDURE — 71045 X-RAY EXAM CHEST 1 VIEW: CPT | Mod: 26

## 2022-01-31 PROCEDURE — 36415 COLL VENOUS BLD VENIPUNCTURE: CPT

## 2022-01-31 PROCEDURE — 93005 ELECTROCARDIOGRAM TRACING: CPT

## 2022-01-31 PROCEDURE — 71045 X-RAY EXAM CHEST 1 VIEW: CPT

## 2022-01-31 PROCEDURE — 99285 EMERGENCY DEPT VISIT HI MDM: CPT

## 2022-01-31 PROCEDURE — 70496 CT ANGIOGRAPHY HEAD: CPT | Mod: MA

## 2022-01-31 PROCEDURE — 70450 CT HEAD/BRAIN W/O DYE: CPT | Mod: MA

## 2022-01-31 PROCEDURE — 80053 COMPREHEN METABOLIC PANEL: CPT

## 2022-01-31 PROCEDURE — 82962 GLUCOSE BLOOD TEST: CPT

## 2022-01-31 RX ORDER — LABETALOL HCL 100 MG
1 TABLET ORAL
Qty: 0 | Refills: 0 | DISCHARGE

## 2022-01-31 RX ORDER — MECLIZINE HCL 12.5 MG
1 TABLET ORAL
Qty: 21 | Refills: 0
Start: 2022-01-31 | End: 2022-02-06

## 2022-01-31 RX ORDER — MECLIZINE HCL 12.5 MG
50 TABLET ORAL ONCE
Refills: 0 | Status: COMPLETED | OUTPATIENT
Start: 2022-01-31 | End: 2022-01-31

## 2022-01-31 RX ORDER — LABETALOL HCL 100 MG
0 TABLET ORAL
Qty: 0 | Refills: 0 | DISCHARGE

## 2022-01-31 RX ORDER — SODIUM CHLORIDE 9 MG/ML
1000 INJECTION INTRAMUSCULAR; INTRAVENOUS; SUBCUTANEOUS ONCE
Refills: 0 | Status: COMPLETED | OUTPATIENT
Start: 2022-01-31 | End: 2022-01-31

## 2022-01-31 RX ADMIN — SODIUM CHLORIDE 1000 MILLILITER(S): 9 INJECTION INTRAMUSCULAR; INTRAVENOUS; SUBCUTANEOUS at 10:00

## 2022-01-31 RX ADMIN — Medication 50 MILLIGRAM(S): at 10:50

## 2022-01-31 NOTE — ASSESSMENT
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  He has had night sweats.  He has been nauseous.  Neurological exam is remarkable only for unsteadiness of gait with tandem walking and with Romberg.  I did not detect nystagmus.  Rule out a peripheral vestibular dysfunction such as benign positional vertigo.  Rule out viral syndrome.  Rule out Covid virus.\par \par Recommendations: I did suggest he go to the emergency room and have CAT scan of the brain and CT angiogram to rule out  vascular etiology and central pathology.  Consider having brain MRI performed depending on results.  Obtain Covid PCR.  Office follow-up as needed.\par

## 2022-01-31 NOTE — ED PROVIDER NOTE - CONSULTANT FREE TEXT FOR MDM DISCUSSED CASE WITH QUESTION
Spoke with Dr. Green, discussed results. He reccd trial of meclizine and will f/u with patient later today to set up outpatient MRI

## 2022-01-31 NOTE — PHYSICAL EXAM
[FreeTextEntry1] : Head:  Normocephalic Neck: Supple nontender no carotid bruits.  Complains of lightheadedness.\par \par Mental Status:  Alert Oriented X3 Speech normal and no aphasia or dysarthria.\par \par Cranial Nerves:  PERRL, Fundi normal Visual Fields full  EOMI no diplopia no ptosis no nystagmus, V through XII intact.\par \par Motor: No drift good strength normal tone no dysmetria.  He has a chronic predominantly right upper extremity postural and intention hand tremor.\par \par DTRs: Symmetric 2+ except for bilaterally diminished ankle reflexes..  Plantars flexor.  No Clonus.\par \par Sensory:  Normal testing with pin light touch and vibration and  Joint position sense.  Normal DSS to touch.\par \par Gait: Unsteady with tandem walking and in the Romberg position.\par

## 2022-01-31 NOTE — ED PROVIDER NOTE - NSICDXPASTMEDICALHX_GEN_ALL_CORE_FT
PAST MEDICAL HISTORY:  Ankylosing spondylitis, unspecified site of spine     Hypertension, unspecified type     Kidney stones

## 2022-01-31 NOTE — ED PROVIDER NOTE - PATIENT PORTAL LINK FT
You can access the FollowMyHealth Patient Portal offered by [facility] by registering at the following website: [email or web address]. By joining [facility]’s FollowMyHealth portal, you will also be able to view your health information using other applications (apps) compatible with our system.

## 2022-01-31 NOTE — HISTORY OF PRESENT ILLNESS
[FreeTextEntry1] : Justin Riley is seen for an office consultation.  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  He has a history of ankylosing spondylitis hypertension hyperlipidemia kidney stones tremor new onset narrow angle glaucoma and GERD recently started on famotidine.  In this setting for approximately the last 5 days he has been intermittently dizzy.  There is some relationship to position but he denies true vertigo.  He symptomatic predominantly while standing and less so while seated.  Neck movements will exacerbate symptoms.  He describes unsteady gait with imbalance.  He describes some poorly characterized visual spatial symptoms but no monocular visual loss or oscillopsia.  He describes being awakened with night sweats  on one occasion.  He is not having tinnitus or hearing loss and he is not having headache.  There has been nausea.  He describes fogginess.\par \par He is fully vaccinated.  He did have Covid in 2020.  He did night sweats for several days in December week but was not tested.\par \par Medication includes labetalol Enbrel Crestor eyedrops for his glaucoma and recently prescribed famotidine.

## 2022-01-31 NOTE — ED PROVIDER NOTE - NSFOLLOWUPINSTRUCTIONS_ED_ALL_ED_FT
Follow up with Dr. Green within 2-3 days. Take the copy of your test results you were given in the emergency room for your doctor to review.     Take the prescribed medication as directed     Stay hydrated    Return to the ER if your symptoms worsen or for any other medical emergencies  ****************  Dizziness    Dizziness can manifest as a feeling of unsteadiness or light-headedness. You may feel like you are about to faint. This condition can be caused by a number of things, including medicines, dehydration, or illness. Drink enough fluid to keep your urine clear or pale yellow. Do not drink alcohol and limit your caffeine intake. Avoid quick or sudden movements.  Rise slowly from chairs and steady yourself until you feel okay. In the morning, first sit up on the side of the bed.    SEEK IMMEDIATE MEDICAL CARE IF YOU HAVE ANY OF THE FOLLOWING SYMPTOMS: vomiting, changes in your vision or speech, weakness in your arms or legs, trouble speaking or swallowing, chest pain, abdominal pain, shortness of breath, sweating, bleeding, headache, neck pain, or fever.

## 2022-01-31 NOTE — ED PROVIDER NOTE - CARE PROVIDER_API CALL
Aneesh Green (MD)  Neurology  [address], [address]  [place], [place] [id number]  Phone: [phone]  Fax: [phone]  Follow Up Time:

## 2022-01-31 NOTE — ED PROVIDER NOTE - ATTENDING CONTRIBUTION TO CARE
Eval with DIGNA Lazaro. 61 y/o M with PMH of HTN, Kidney stones, HLD, GERD presents to the ED with c/o dizziness and feeling off balance x 4 days. He saw Dr. Green in the office today and was sent to the ED for eval.  Pt also incidentally reported flank pain yesterday a/w nausea, similar to prior episodes of renal colic. Pt denies visual changes, f/c, vomiting, HA, extremity weakness, CP or SOB. PE as noted above. NIHSS is 0, pts gait is unremarkable. labs reviewed, trops x 2 neg. UA neg. CT head. CTAs neg. CT stone [place] results reviewed with patient. Pt reports feeling better with meclizine. will dc with neuro f/u.     I performed a face to face bedside interview with patient regarding history of present illness, review of symptoms and past medical history. I completed an independent physical exam.  I have discussed the patient's plan of care with Physician Assistant (PA). I agree with note as stated above, having amended the EMR as needed to reflect my findings.   This includes History of Present Illness, HIV, Past Medical/Surgical/Family/Social History, Allergies and Home Medications, Review of Systems, Physical Exam, and any Progress Notes during the time I functioned as the attending physician for this patient.

## 2022-01-31 NOTE — ED PROVIDER NOTE - OBJECTIVE STATEMENT
63 y/o M with PMH of HTN, Kidney stones, HLD, GERD presents to the ED with c/o dizziness and feeling off balance x 4 days. He saw Dr. Green in the office today and was sent to the ED for eval.  Pt also incidentally reported flank pain yesterday a/w nausea, similar to prior episodes of renal colic. Pt denies visual changes, f/c, vomiting, HA, extremity weakness, CP or SOB

## 2022-01-31 NOTE — ED ADULT NURSE NOTE - SUICIDE SCREENING QUESTION 1
Pt is here today for follow up on post surgical hypothyroidism. Pt was last seen on 12/4/20. Pt had papillary thyroid cancer.     1.  What are you currently taking for thyroid disease?  Levothyroxine 75 mcg QD    2.  Cardiac symptoms:  none    3.  Nutritional symptoms:  none    4.  Activity symptoms:  fatigue    5.  Sensory symptoms:  none    6.  Mood symptoms:  none    7.  Eye symptoms:  none    8.  Skin symptoms:  none    9.  Menstrual symptoms:  regular    10.  Neck symptoms:  none    Denies known Latex allergy or symptoms of Latex sensitivity.  Medication list verified.  Tobacco verified.           No

## 2022-03-09 ENCOUNTER — APPOINTMENT (OUTPATIENT)
Dept: CARDIOLOGY | Facility: CLINIC | Age: 62
End: 2022-03-09
Payer: COMMERCIAL

## 2022-03-09 PROCEDURE — 93306 TTE W/DOPPLER COMPLETE: CPT

## 2022-03-09 PROCEDURE — 99072 ADDL SUPL MATRL&STAF TM PHE: CPT

## 2022-04-27 ENCOUNTER — APPOINTMENT (OUTPATIENT)
Dept: CARDIOLOGY | Facility: CLINIC | Age: 62
End: 2022-04-27
Payer: COMMERCIAL

## 2022-04-27 PROCEDURE — 93015 CV STRESS TEST SUPVJ I&R: CPT

## 2022-04-27 PROCEDURE — 99072 ADDL SUPL MATRL&STAF TM PHE: CPT

## 2023-11-01 DIAGNOSIS — I65.23 OCCLUSION AND STENOSIS OF BILATERAL CAROTID ARTERIES: ICD-10-CM

## 2023-12-05 ENCOUNTER — APPOINTMENT (OUTPATIENT)
Dept: CARDIOLOGY | Facility: CLINIC | Age: 63
End: 2023-12-05
Payer: COMMERCIAL

## 2023-12-05 PROCEDURE — 93880 EXTRACRANIAL BILAT STUDY: CPT

## 2023-12-05 PROCEDURE — 93306 TTE W/DOPPLER COMPLETE: CPT

## 2024-01-24 ENCOUNTER — APPOINTMENT (OUTPATIENT)
Dept: PULMONOLOGY | Facility: CLINIC | Age: 64
End: 2024-01-24
Payer: COMMERCIAL

## 2024-01-24 VITALS
TEMPERATURE: 96 F | HEART RATE: 65 BPM | WEIGHT: 155 LBS | OXYGEN SATURATION: 92 % | BODY MASS INDEX: 20.99 KG/M2 | SYSTOLIC BLOOD PRESSURE: 122 MMHG | DIASTOLIC BLOOD PRESSURE: 80 MMHG | HEIGHT: 72 IN

## 2024-01-24 DIAGNOSIS — J45.40 MODERATE PERSISTENT ASTHMA, UNCOMPLICATED: ICD-10-CM

## 2024-01-24 DIAGNOSIS — G47.33 OBSTRUCTIVE SLEEP APNEA (ADULT) (PEDIATRIC): ICD-10-CM

## 2024-01-24 PROCEDURE — 99205 OFFICE O/P NEW HI 60 MIN: CPT

## 2024-01-24 RX ORDER — BUDESONIDE AND FORMOTEROL FUMARATE DIHYDRATE 80; 4.5 UG/1; UG/1
80-4.5 AEROSOL RESPIRATORY (INHALATION)
Qty: 1 | Refills: 5 | Status: ACTIVE | COMMUNITY
Start: 2024-01-24 | End: 1900-01-01

## 2024-01-24 RX ORDER — DORZOLAMIDE HYDROCHLORIDE 20 MG/ML
2 SOLUTION OPHTHALMIC
Refills: 0 | Status: ACTIVE | COMMUNITY

## 2024-01-24 RX ORDER — FINASTERIDE 1 MG/1
1 TABLET ORAL
Refills: 0 | Status: ACTIVE | COMMUNITY

## 2024-01-24 NOTE — PHYSICAL EXAM
[Enlarged Base of the Tongue] : enlarged base of the tongue [Supple] : supple [IV] : Mallampati Class: IV [No JVD] : no jvd [Normal Rate/Rhythm] : normal rate/rhythm [Normal S1, S2] : normal s1, s2 [No Murmurs] : no murmurs [No Resp Distress] : no resp distress [No Acc Muscle Use] : no acc muscle use [Benign] : benign [Not Tender] : not tender [No HSM] : no hsm [No Hernias] : no hernias [Normal Gait] : normal gait [No Clubbing] : no clubbing [No Edema] : no edema [No Motor Deficits] : no motor deficits [No Rash] : no rash [Normal Affect] : normal affect [TextBox_11] : short neck  limited ROM      short jaw  line  [TextBox_2] : pleasant male no distress no cough  no sob  on speech [TextBox_44] : limited  ROM    [TextBox_68] : prolonged  expiratory phase   expiratory w  on forced expiration

## 2024-01-24 NOTE — HISTORY OF PRESENT ILLNESS
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     ankylosing spondylitis and RA - limited neck mobility    x years  on enbrel   negative quanteferon    here for FLORY    IgA  deficiency    -   here for  FLORY - study done due to  Glaucoma of eyes  per   ophthalmologist     -  4-5   uri   years  pneumonia     left    covid hx+ -active   plays [employer] ball  -has noticed  mild decrease exercise tolerance however thought age related - denies wheezing  no cough    - [TextBox_29] : cigar [Awakes Unrefreshed] : does not awaken unrefreshed [Awakes with Headache] : does not awaken with headache [Fatigue] : no fatigue [Home] : home [TextBox_100] : 11/23 [TextBox_108] : 15.2 [TextBox_112] : 0.7min  [TextBox_116] : 75 [TextBox_120] : avg  saturation > 90 %     HR variation noted

## 2024-01-24 NOTE — REVIEW OF SYSTEMS
[Dry Mouth] : dry mouth [Wheezing] : wheezing [GERD] : gerd [Fever] : no fever [Fatigue] : no fatigue [Recent Wt Gain (___ Lbs)] : ~T no recent weight gain [Chills] : no chills [Poor Appetite] : no poor appetite [Recent Wt Loss (___ Lbs)] : ~T no recent weight loss [Sore Throat] : no sore throat [Nasal Congestion] : no nasal congestion [Sinus Problems] : no sinus problems [Chest Discomfort] : no chest discomfort [Palpitations] : no palpitations [Abdominal Pain] : no abdominal pain [Nausea] : no nausea [Vomiting] : no vomiting [Dysphagia] : no dysphagia [Bleeding] : no bleeding [Rash] : no rash [Blood Transfusion] : no blood transfusion [Clotting Disorder/ Frequent bleeding] : no clotting disorder/ frequent bleeding [Diabetes] : no diabetes [Thyroid Problem] : no thyroid problem [Obesity] : no obesity [TextBox_91] : neck  limited   range of motion

## 2024-01-24 NOTE — ASSESSMENT
[FreeTextEntry1] : 63y/o male  1-   wheezing  with h/o  cigar smoking   ? asthma vs COPD  on propranolol  2- ct 2020  calcified  granuloma  small  LN    hiatal hernia   kidney stones 3- h/o  RA and ankylosing spondylitis  on Enbrel / neck   4- 11/2023 moderate FLORY  with   heart rate variability  /  ECHO 11/23  NL RV/  PAP-  EF 61 %  mild MR  5- GERD  hiatal  hernia on  ct 2020  6- vaccination per primary   Recommendation s 1- FENO   PFT full  2- cxr then HRCT  3- symbicort  one inh q 12 and prn reviewed      4- full sleep study  with heart rate  to review     sleep referral     5- sleep hygiene and   possible treatment reviewed 6- pepcid    diet   agreement on plan  -counseling and  chart reviewed  imaging reviewed

## 2024-01-24 NOTE — PROCEDURE
[FreeTextEntry1] : PROCEDURE DATE: 03/17/2020  INTERPRETATION: CLINICAL INFORMATION: 60-year-old man with night sweats for one week. Suspected lymphoma.  COMPARISON: CT scan 07/07/2009. MRI 12/18/2013  PROCEDURE: CT of the Chest, Abdomen and Pelvis was performed with intravenous contrast. Intravenous contrast: 90 ml Omnipaque 350. 10 ml discarded. Oral contrast: Positive contrast was administered. Sagittal and coronal reformats were performed.  FINDINGS:  CHEST:  LUNGS AND LARGE AIRWAYS: Patent central airways. Right lower lobe calcified granuloma PLEURA: No pleural effusion. VESSELS: Dilated ascending aorta to 4.4 cm, previously 4 cm on MRI 12/18/2013 HEART: Heart size is normal. No pericardial effusion. MEDIASTINUM AND NAVEED: Small mediastinal lymph nodes. Small hiatus hernia. CHEST WALL AND LOWER NECK: Within normal limits.  ABDOMEN AND PELVIS:  LIVER: Within normal limits. BILE DUCTS: Normal caliber. GALLBLADDER: Within normal limits. SPLEEN: Within normal limits. PANCREAS: Within normal limits. ADRENALS: Within normal limits. KIDNEYS/URETERS: Bilateral nonobstructing renal calculi measuring 2 mm in on the left and 3 mm on the right.  BLADDER: Mural thickening versus underdistention. REPRODUCTIVE ORGANS: Normal size prostate  BOWEL: No bowel obstruction. Appendix not visualized PERITONEUM: No ascites. 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 RETROPERITONEUM/LYMPH NODES: No lymphadenopathy. ABDOMINAL WALL: Within normal limits. BONES: Within normal limits.  IMPRESSION:  No evidence of lymphoma. Bilateral nonobstructing renal calculi. Bladder wall thickening. Ascending aortic aneurysm.            TEODORA PHAM M.D., ATTENDING RADIOLOGIST This document has been electronically signed. Mar 17 2020 4:14PM

## 2024-02-14 ENCOUNTER — APPOINTMENT (OUTPATIENT)
Dept: PULMONOLOGY | Facility: CLINIC | Age: 64
End: 2024-02-14
Payer: COMMERCIAL

## 2024-02-14 ENCOUNTER — RESULT CHARGE (OUTPATIENT)
Age: 64
End: 2024-02-14

## 2024-02-14 PROCEDURE — 94729 DIFFUSING CAPACITY: CPT

## 2024-02-14 PROCEDURE — 94727 GAS DIL/WSHOT DETER LNG VOL: CPT

## 2024-02-14 PROCEDURE — 95012 NITRIC OXIDE EXP GAS DETER: CPT

## 2024-02-14 PROCEDURE — 94010 BREATHING CAPACITY TEST: CPT

## 2024-02-21 ENCOUNTER — APPOINTMENT (OUTPATIENT)
Dept: RHEUMATOLOGY | Facility: CLINIC | Age: 64
End: 2024-02-21
Payer: COMMERCIAL

## 2024-02-21 VITALS
OXYGEN SATURATION: 98 % | HEART RATE: 71 BPM | SYSTOLIC BLOOD PRESSURE: 118 MMHG | HEIGHT: 72 IN | RESPIRATION RATE: 16 BRPM | TEMPERATURE: 97.4 F | BODY MASS INDEX: 20.99 KG/M2 | WEIGHT: 155 LBS | DIASTOLIC BLOOD PRESSURE: 70 MMHG

## 2024-02-21 DIAGNOSIS — M25.551 PAIN IN RIGHT HIP: ICD-10-CM

## 2024-02-21 DIAGNOSIS — M25.552 PAIN IN RIGHT HIP: ICD-10-CM

## 2024-02-21 DIAGNOSIS — M45.9 ANKYLOSING SPONDYLITIS OF UNSPECIFIED SITES IN SPINE: ICD-10-CM

## 2024-02-21 DIAGNOSIS — D80.2 SELECTIVE DEFICIENCY OF IMMUNOGLOBULIN A [IGA]: ICD-10-CM

## 2024-02-21 PROCEDURE — XXXXX: CPT | Mod: 1L

## 2024-02-21 NOTE — ASSESSMENT
[FreeTextEntry1] : KAYLEY SHORE is a 64 year old man with below --   #  All questions and concerns addressed to my best possible ability, patient verbalizes understanding and is agreeable. RTC

## 2024-02-21 NOTE — HISTORY OF PRESENT ILLNESS
[FreeTextEntry1] : KAYLEY SHORE is a 64 year old man who presents with   Hx of AS x 40 years - knees initially but not currently. Hips, SI joints, and neck have been most active. Has been on Enbrel x years with good response. Inflammatory arthritis ROS negative for symmetrical peripheral joint synovitis, prolonged AM stiffness, enthesitis, dactylitis, psoriasis/ rashes, eye inflammation, inflammatory low back pain, IBD.   + FLORY  + hypothyroid  + glaucoma - Dr Bell    Negative FH for autoimmune d/o

## 2024-02-22 ENCOUNTER — RESULT REVIEW (OUTPATIENT)
Age: 64
End: 2024-02-22

## 2024-02-28 ENCOUNTER — APPOINTMENT (OUTPATIENT)
Dept: RADIOLOGY | Facility: HOSPITAL | Age: 64
End: 2024-02-28

## 2024-02-28 ENCOUNTER — OUTPATIENT (OUTPATIENT)
Dept: OUTPATIENT SERVICES | Facility: HOSPITAL | Age: 64
LOS: 1 days | End: 2024-02-28
Payer: COMMERCIAL

## 2024-02-28 ENCOUNTER — APPOINTMENT (OUTPATIENT)
Dept: RADIOLOGY | Facility: HOSPITAL | Age: 64
End: 2024-02-28
Payer: COMMERCIAL

## 2024-02-28 ENCOUNTER — APPOINTMENT (OUTPATIENT)
Dept: ULTRASOUND IMAGING | Facility: HOSPITAL | Age: 64
End: 2024-02-28
Payer: COMMERCIAL

## 2024-02-28 DIAGNOSIS — Z00.8 ENCOUNTER FOR OTHER GENERAL EXAMINATION: ICD-10-CM

## 2024-02-28 PROCEDURE — 72100 X-RAY EXAM L-S SPINE 2/3 VWS: CPT | Mod: 26

## 2024-02-28 PROCEDURE — 71046 X-RAY EXAM CHEST 2 VIEWS: CPT | Mod: 26

## 2024-02-28 PROCEDURE — 72100 X-RAY EXAM L-S SPINE 2/3 VWS: CPT

## 2024-02-28 PROCEDURE — 73521 X-RAY EXAM HIPS BI 2 VIEWS: CPT

## 2024-02-28 PROCEDURE — 73521 X-RAY EXAM HIPS BI 2 VIEWS: CPT | Mod: 26

## 2024-02-28 PROCEDURE — 71046 X-RAY EXAM CHEST 2 VIEWS: CPT

## 2024-02-28 PROCEDURE — 72052 X-RAY EXAM NECK SPINE 6/>VWS: CPT

## 2024-02-28 PROCEDURE — 72052 X-RAY EXAM NECK SPINE 6/>VWS: CPT | Mod: 26

## 2024-02-28 PROCEDURE — 76536 US EXAM OF HEAD AND NECK: CPT | Mod: 26

## 2024-02-28 PROCEDURE — 76536 US EXAM OF HEAD AND NECK: CPT

## 2024-02-29 ENCOUNTER — NON-APPOINTMENT (OUTPATIENT)
Age: 64
End: 2024-02-29

## 2024-02-29 DIAGNOSIS — R93.89 ABNORMAL FINDINGS ON DIAGNOSTIC IMAGING OF OTHER SPECIFIED BODY STRUCTURES: ICD-10-CM

## 2024-03-04 ENCOUNTER — NON-APPOINTMENT (OUTPATIENT)
Age: 64
End: 2024-03-04

## 2024-03-05 ENCOUNTER — APPOINTMENT (OUTPATIENT)
Dept: CT IMAGING | Facility: HOSPITAL | Age: 64
End: 2024-03-05
Payer: COMMERCIAL

## 2024-03-05 ENCOUNTER — OUTPATIENT (OUTPATIENT)
Dept: OUTPATIENT SERVICES | Facility: HOSPITAL | Age: 64
LOS: 1 days | End: 2024-03-05
Payer: COMMERCIAL

## 2024-03-05 DIAGNOSIS — R93.89 ABNORMAL FINDINGS ON DIAGNOSTIC IMAGING OF OTHER SPECIFIED BODY STRUCTURES: ICD-10-CM

## 2024-03-05 DIAGNOSIS — M45.9 ANKYLOSING SPONDYLITIS OF UNSPECIFIED SITES IN SPINE: ICD-10-CM

## 2024-03-05 PROCEDURE — 71260 CT THORAX DX C+: CPT | Mod: 26

## 2024-03-05 PROCEDURE — 71260 CT THORAX DX C+: CPT

## 2024-03-06 ENCOUNTER — NON-APPOINTMENT (OUTPATIENT)
Age: 64
End: 2024-03-06

## 2024-04-01 ENCOUNTER — APPOINTMENT (OUTPATIENT)
Dept: PULMONOLOGY | Facility: CLINIC | Age: 64
End: 2024-04-01
Payer: COMMERCIAL

## 2024-04-01 VITALS
DIASTOLIC BLOOD PRESSURE: 68 MMHG | HEIGHT: 72 IN | SYSTOLIC BLOOD PRESSURE: 116 MMHG | HEART RATE: 62 BPM | OXYGEN SATURATION: 96 % | TEMPERATURE: 97.3 F | BODY MASS INDEX: 20.99 KG/M2 | WEIGHT: 155 LBS

## 2024-04-01 DIAGNOSIS — G47.33 OBSTRUCTIVE SLEEP APNEA (ADULT) (PEDIATRIC): ICD-10-CM

## 2024-04-01 PROCEDURE — 99204 OFFICE O/P NEW MOD 45 MIN: CPT

## 2024-04-01 RX ORDER — FAMOTIDINE 40 MG/1
40 TABLET, FILM COATED ORAL
Refills: 0 | Status: ACTIVE | COMMUNITY

## 2024-04-01 RX ORDER — ROSUVASTATIN CALCIUM 10 MG/1
10 TABLET, FILM COATED ORAL
Refills: 0 | Status: ACTIVE | COMMUNITY

## 2024-04-01 RX ORDER — MEMANTINE HYDROCHLORIDE 28 MG/1
28 CAPSULE, EXTENDED RELEASE ORAL
Refills: 0 | Status: ACTIVE | COMMUNITY

## 2024-04-01 RX ORDER — ROSUVASTATIN CALCIUM 5 MG/1
5 TABLET, FILM COATED ORAL
Qty: 90 | Refills: 1 | Status: DISCONTINUED | COMMUNITY
Start: 2020-03-14 | End: 2024-04-01

## 2024-04-01 RX ORDER — DUTASTERIDE 0.5 MG/1
0.5 CAPSULE, LIQUID FILLED ORAL
Refills: 0 | Status: ACTIVE | COMMUNITY

## 2024-04-01 RX ORDER — ASPIRIN ENTERIC COATED TABLETS 81 MG 81 MG/1
81 TABLET, DELAYED RELEASE ORAL
Refills: 0 | Status: ACTIVE | COMMUNITY

## 2024-04-01 NOTE — ASSESSMENT
[FreeTextEntry1] : 64-year-old male with evidence of mild sleep disordered breathing on home oximetry testing.  He has a history of comorbid glaucoma which triggered the assessment for possible sleep disordered breathing.  He has minimal sleep apnea related symptomatology with ESS=2.  Nevertheless the home oximetry demonstrated an FARHAD in the mild to moderate range which is suggestive of possible sleep disordered breathing.  The type of home technology utilized for this test may be less accurate in the more mild range of sleep disordered breathing.  Because of his medical comorbidities is important to accurately assess for sleep disordered breathing.  A more robust HST utilizing nasal cannula, respiratory effort oximetry and plethysmography will be ordered via the [facility] sleep disorder center for further assessment.  The ramifications of obstructive sleep apnea and its potential therapeutic modalities were discussed with the patient.

## 2024-04-01 NOTE — PHYSICAL EXAM
[General Appearance - Well Developed] : well developed [Normal Appearance] : normal appearance [Well Groomed] : well groomed [General Appearance - Well Nourished] : well nourished [No Deformities] : no deformities [General Appearance - In No Acute Distress] : no acute distress [Low Lying Soft Palate] : low lying soft palate [Enlarged Base of the Tongue] : enlargement of the base of the tongue [III] : III [FreeTextEntry1] : Retrognathia noted

## 2024-04-03 NOTE — HISTORY OF PRESENT ILLNESS
[Daytime Somnolence] : no daytime somnolence [ESS] : 2 [FreeTextEntry1] : 65 yo M pmhx aortic ectasia, HLD, RA, Ankylosing spondylosis on enbrel, IgA deficiency here for sleep evaluation.  Had HST - was noted to have a AHI 15.2, T90 0.7 with >90% HR variation noted. Recommended to be seen for further evaluation by Dr. Miller

## 2024-04-30 ENCOUNTER — OUTPATIENT (OUTPATIENT)
Dept: OUTPATIENT SERVICES | Facility: HOSPITAL | Age: 64
LOS: 1 days | End: 2024-04-30
Payer: COMMERCIAL

## 2024-04-30 ENCOUNTER — APPOINTMENT (OUTPATIENT)
Dept: SLEEP CENTER | Facility: CLINIC | Age: 64
End: 2024-04-30
Payer: COMMERCIAL

## 2024-04-30 PROCEDURE — 95800 SLP STDY UNATTENDED: CPT

## 2024-04-30 PROCEDURE — 95800 SLP STDY UNATTENDED: CPT | Mod: 26

## 2024-05-06 DIAGNOSIS — G47.33 OBSTRUCTIVE SLEEP APNEA (ADULT) (PEDIATRIC): ICD-10-CM

## 2024-05-15 ENCOUNTER — APPOINTMENT (OUTPATIENT)
Dept: PULMONOLOGY | Facility: CLINIC | Age: 64
End: 2024-05-15

## 2024-08-07 ENCOUNTER — APPOINTMENT (OUTPATIENT)
Dept: RHEUMATOLOGY | Facility: CLINIC | Age: 64
End: 2024-08-07

## 2024-08-07 PROBLEM — M47.22 OSTEOARTHRITIS OF SPINE WITH RADICULOPATHY, CERVICAL REGION: Status: ACTIVE | Noted: 2024-08-07

## 2024-08-07 PROCEDURE — G2211 COMPLEX E/M VISIT ADD ON: CPT

## 2024-08-07 PROCEDURE — 99204 OFFICE O/P NEW MOD 45 MIN: CPT

## 2024-08-08 PROBLEM — M54.2 CERVICAL SPINE PAIN: Status: ACTIVE | Noted: 2024-08-08

## 2024-08-08 PROBLEM — M16.0 OSTEOARTHRITIS, HIP, BILATERAL: Status: ACTIVE | Noted: 2024-08-08

## 2024-08-08 NOTE — DATA REVIEWED
Internal Medicine History & Physical    Please refer to attending note for official recommendations.     Chief Complaint   Patient presents with   • Abnormal Lab     Sent from PMD fro abnormal Potassium, pt has no physical complaints       History Of Present Illness  Prakash is a 65 year old female with a PMHx COPD, paroxysmal atrial fibrillation on eliquis, seizure disorder, DVT/PE (2018), breast cancer (2012), and alcohol/tobacco use disorder presenting for lab abnormality. Patient was seen by neurologist yesterday for follow up for seizure disorder and had routine lab work which revealed K 2.8 and bicarb 42 and was subsequently advised to present to ED for further evaluation. Upon further questioning, patient admitted that she had been increasingly fatigued over the past week with poor appetite. She also admitted to increased dyspnea on exertion during this time. She admitted to loose stool once daily and nausea but stated these are chronic issues. Per chart review, patient has had chronic hypokalemia without clear etiology. Patient denied chest pain, dyspnea at rest, emesis, weight loss, or abdominal pain. She also denied fever, sick contacts, cough, or other upper respiratory symptoms.     Upon arrival to the ED, patient was afebrile with , /92, RR 20. Initial SpO2 92% and later down to 85% which improved with 2L NC. Initial labs revealed K 2.6, Cr 0.49, Hgb 17.3, VBG 7.48/62/32/46. CXR showed no acute abnormalities, and EKG showed sinus rhythm with PVCs and nonspecific repolarization abnormalities. In the ED, patient was given 80mg PO KCl, 50mg prednisone, and 2 puffs albuterol and admitted for further evaluation and management.      Review of Systems  Review of Systems   Constitutional: Positive for fatigue. Negative for fever.   HENT: Negative for sore throat.    Eyes: Negative for visual disturbance.   Respiratory: Positive for shortness of breath. Negative for cough.    Cardiovascular: Negative  [FreeTextEntry1] : Available notes, and pertinent labs & imaging in EMR reviewed. Pertinent labs and imaging summarized in HPI or A/P.  for chest pain, palpitations and leg swelling.   Gastrointestinal: Negative for abdominal pain, constipation and diarrhea.   Genitourinary: Negative for dysuria.   Musculoskeletal: Negative for gait problem.   Skin: Negative for wound.   Neurological: Negative for dizziness, weakness and light-headedness.   Psychiatric/Behavioral: Negative for confusion.        Past Medical History  Past Medical History:   Diagnosis Date   • Abnormal electrocardiogram (ECG) (EKG) 2/24/2020   • Abnormal liver function test    • Abnormal radionuclide heart study    • Acute deep vein thrombosis (DVT) of distal vein of both lower extremities (CMS/HCC)    • Acute deep vein thrombosis (DVT) of distal vein of both lower extremities (CMS/HCC) 7/31/2018   • Acute respiratory failure with hypoxia (CMS/HCC)    • Alcohol dependence (CMS/HCC)    • Alcohol-induced mood disorder (CMS/HCC)    • Anxiety    • Benign essential hypertension    • Breast cancer (CMS/HCC)    • Cancer (CMS/HCC)    • Chronic diastolic heart failure (CMS/HCC)    • Current use of long term anticoagulation    • Depression    • Depression with anxiety    • Drug abuse (CMS/HCC)    • Drug-induced mood disorder (CMS/HCC)    • Family history of bleeding or clotting disorder    • Fatty liver 2/24/2020   • Head ache    • Heart muscle disorder caused by another medical condition (CMS/HCC)    • History of echocardiogram    • History of echocardiogram 5/24/2012    Transthoracic echocardiographic study performed 05/24/2012.  Mildly dilated left atrium with evidence of lipomatous hypertrophy of the intra-atrial septum.  LVEF of 65%.  Mild TR.   • History of echocardiogram 7/16/2018    Transthoracic echocardiographic study performed 07/16/2018.  LVEF 40 to 45% mildly dilated left atrium.  Moderately dilated RV with moderately reduced RV systolic performance.  Mildly to moderately dilated RA.  There was a possible mobile thrombus within the atrial cavity.  This was done in the setting of  known venothromboembolic disease.   • History of echocardiogram 2018    Transthoracic echocardiographic study performed 2018.  LVEF 75 to 80% without segmentality.  The patient was in atrial fibrillation at the time.  Trivial MR.  Mildly dilated LA.  Moderately dilated RV with moderately reduced RV systolic performance.  Mild PI/TR.  Mild pulmonary hypertension.   • History of echocardiogram 2018    Transthoracic echocardiographic study performed 2018.  LVEF 66% without segmentality.  Grade 1 diastolic dysfunction.  Trivial MR.  Mildly dilated LA.  Dilated RV with reduced RV systolic performance.  Mild TR.  Mildly dilated RA.   • History of pulmonary embolism 2018    With acute cor pulmonale 2018.   • Lung disorder    • Malignant neoplasm of left female breast (CMS/HCC)    • Opioid dependence (CMS/HCC)    • Opioid withdrawal (CMS/HCC)    • Other pulmonary embolism with acute cor pulmonale (CMS/HCC)    • Paroxysmal atrial fibrillation (CMS/HCC)    • Paroxysmal atrial flutter (CMS/HCC)    • Personal history of DVT (deep vein thrombosis)    • Preoperative cardiovascular examination 2020    Prior to intended vitrectomy/sutured IOL of left eye to be performed at [facility] 2020.   • Pulmonary embolism with acute cor pulmonale (CMS/HCC)    • Pulmonary emphysema (CMS/HCC)    • Right ventricular dysfunction    • Seizure (CMS/HCC)    • Taking medication for chronic disease         Surgical History  Past Surgical History:   Procedure Laterality Date   • Breast lumpectomy     • Hernia repair      umbilical   • Mastectomy     • Tubal ligation          Social History  Social History     Tobacco Use   • Smoking status: Former Smoker     Packs/day: 0.00     Last attempt to quit: 2019     Years since quittin.5   • Smokeless tobacco: Never Used   Substance Use Topics   • Alcohol use: Not Currently   • Drug use: Not Currently        Family History  Family History   Problem  Relation Age of Onset   • Dementia/Alzheimers Mother    • Heart disease Father    • Myocardial Infarction Father    • Cancer Sister         brain   • Patient is unaware of any medical problems Daughter    • Patient is unaware of any medical problems Son         Allergies  ALLERGIES: no known allergies.    Medications  Prior to Admission medications    Medication Sig Start Date End Date Taking? Authorizing Provider   ELIQUIS 5 MG Tab TAKE 1 TABLET BY MOUTH EVERY 12 HOURS 7/16/20   Octavia Treviño MD   BREO ELLIPTA 100-25 MCG/INH inhaler INHALE 1 PUFF INTO THE LUNGS EVERY 24 HOURS( ONCE DAILY). 7/16/20   Octavia Treviño MD   metoPROLOL tartrate (LOPRESSOR) 50 MG tablet Take 1.5 tablets by mouth 2 times daily. 6/23/20   Octavia Treviño MD   venlafaxine XR (EFFEXOR XR) 75 MG 24 hr capsule Take 1 capsule by mouth daily. 6/23/20   Octavia Treviño MD   digoxin (LANOXIN) 0.125 MG tablet Take 1 tablet by mouth daily. 6/16/20   Octavia Treviño MD   folic acid (FOLATE) 1 MG tablet Take 1 tablet by mouth daily. 5/12/20   Octavia Treviño MD   prednisoLONE acetate (PRED FORTE) 1 % ophthalmic suspension SHAKE LQ AND INT 1 GTT IN OS QID 3/6/20   Outside Provider   levETIRAcetam (KEPPRA) 500 MG tablet Take 1.5 tablets by mouth every 12 hours. 4/11/20   Trey Morris MD   thiamine (VITAMIN B1) 100 MG tablet Take 1 tablet by mouth daily. Do not start before April 12, 2020. 4/12/20   Trey Morris MD   Multiple Vitamins-Minerals (VITAMIN - THERAPEUTIC MULTIVITAMINS W/MINERALS) tablet Take 1 tablet by mouth daily. Do not start before April 12, 2020. 4/12/20   Trey Morris MD   INCRUSE ELLIPTA 62.5 MCG/INH inhaler INHALE 1 PUFF BY MOUTH EVERY DAY 1/9/20   Octavia Treviño MD         Current Facility-Administered Medications   Medication Dose Route Frequency Provider Last Rate Last Dose   • potassium CHLORIDE (KLOR-CON M) zachery ER tablet 40 mEq  40 mEq Oral Once Luis Vizcarra MD          Current Outpatient Medications   Medication Sig Dispense Refill   • ELIQUIS 5 MG Tab TAKE 1 TABLET BY MOUTH EVERY 12 HOURS 180 tablet 0   • BREO ELLIPTA 100-25 MCG/INH inhaler INHALE 1 PUFF INTO THE LUNGS EVERY 24 HOURS( ONCE DAILY). 60 each 1   • metoPROLOL tartrate (LOPRESSOR) 50 MG tablet Take 1.5 tablets by mouth 2 times daily. 270 tablet 0   • venlafaxine XR (EFFEXOR XR) 75 MG 24 hr capsule Take 1 capsule by mouth daily. 90 capsule 0   • digoxin (LANOXIN) 0.125 MG tablet Take 1 tablet by mouth daily. 90 tablet 0   • folic acid (FOLATE) 1 MG tablet Take 1 tablet by mouth daily. 90 tablet 0   • prednisoLONE acetate (PRED FORTE) 1 % ophthalmic suspension SHAKE LQ AND INT 1 GTT IN OS QID     • levETIRAcetam (KEPPRA) 500 MG tablet Take 1.5 tablets by mouth every 12 hours.     • thiamine (VITAMIN B1) 100 MG tablet Take 1 tablet by mouth daily. Do not start before April 12, 2020. 30 tablet 0   • Multiple Vitamins-Minerals (VITAMIN - THERAPEUTIC MULTIVITAMINS W/MINERALS) tablet Take 1 tablet by mouth daily. Do not start before April 12, 2020. 30 tablet 0   • INCRUSE ELLIPTA 62.5 MCG/INH inhaler INHALE 1 PUFF BY MOUTH EVERY DAY 30 each 0         Last Recorded Vitals  Vitals with min/max:      Vital Last Value 24 Hour Range   Temperature 97.7 °F (36.5 °C) (07/22/20 0943) Temp  Min: 97.7 °F (36.5 °C)  Max: 97.7 °F (36.5 °C)   Pulse (!) 112 (07/22/20 0943) Pulse  Min: 112  Max: 112   Respiratory 20 (07/22/20 0943) Resp  Min: 20  Max: 20   Non-Invasive  Blood Pressure 126/71 (07/22/20 1402) BP  Min: 118/76  Max: 126/71   Pulse Oximetry (!) 85 % (07/22/20 1402) SpO2  Min: 85 %  Max: 92 %   Arterial   Blood Pressure   No data recorded      No intake/output data recorded.  No intake/output data recorded.    Physical Exam  Constitutional:       Appearance: Normal appearance.   HENT:      Head: Normocephalic and atraumatic.      Mouth/Throat:      Mouth: Mucous membranes are dry.   Eyes:      Pupils: Pupils are equal, round,  and reactive to light.   Neck:      Musculoskeletal: Normal range of motion.   Cardiovascular:      Rate and Rhythm: Normal rate and regular rhythm.   Pulmonary:      Effort: Pulmonary effort is normal. No respiratory distress.      Breath sounds: Wheezing (diffuse expiratory wheezing bilaterally) present.   Abdominal:      General: Bowel sounds are normal. There is no distension.      Palpations: Abdomen is soft.   Musculoskeletal:         General: No swelling.   Skin:     General: Skin is warm and dry.      Capillary Refill: Capillary refill takes less than 2 seconds.   Neurological:      General: No focal deficit present.      Mental Status: She is alert and oriented to person, place, and time.   Psychiatric:         Mood and Affect: Mood normal.         Behavior: Behavior normal.            Imaging  XR CHEST PA OR AP 1 VIEW   Final Result   1. No acute cardiopulmonary process.      Electronically Signed by: REMI LUNA M.D.    Signed on: 7/22/2020 1:51 PM                Labs   Recent Results (from the past 24 hour(s))   COMPREHENSIVE METABOLIC PANEL    Collection Time: 07/21/20  4:15 PM   Result Value Ref Range    Fasting Status 12 hrs    Sodium 136 135 - 145 mmol/L    Potassium 2.8 (L) 3.4 - 5.1 mmol/L    Chloride 86 (L) 98 - 107 mmol/L    Carbon Dioxide 42 (HH) 21 - 32 mmol/L    Anion Gap 11 10 - 20 mmol/L    Glucose 97 65 - 99 mg/dL    BUN 5 (L) 6 - 20 mg/dL    Creatinine 0.48 (L) 0.51 - 0.95 mg/dL    GFR Estimate,  >90     GFR Estimate, Non African American >90     BUN/Creatinine Ratio 10 7 - 25    CALCIUM 9.4 8.4 - 10.2 mg/dL    TOTAL BILIRUBIN 0.9 0.2 - 1.0 mg/dL    AST/SGOT 48 (H) <38 Units/L    ALT/SGPT 40 <64 Units/L    ALK PHOSPHATASE 103 45 - 117 Units/L    TOTAL PROTEIN 6.9 6.4 - 8.2 g/dL    Albumin 3.4 (L) 3.6 - 5.1 g/dL    GLOBULIN 3.5 2.0 - 4.0 g/dL    A/G Ratio, Serum 1.0 1.0 - 2.4   CBC WITH DIFFERENTIAL    Collection Time: 07/21/20  4:15 PM   Result Value Ref Range    WBC 9.3  4.2 - 11.0 K/mcL    RBC 5.27 (H) 4.00 - 5.20 mil/mcL    HGB 17.0 (H) 12.0 - 15.5 g/dL    HCT 52.8 (H) 36.0 - 46.5 %    .2 (H) 78.0 - 100.0 fl    MCH 32.3 26.0 - 34.0 pg    MCHC 32.2 32.0 - 36.5 g/dL    RDW-CV 15.5 (H) 11.0 - 15.0 %     140 - 450 K/mcL    NRBC 0 0 /100 WBC    DIFF TYPE AUTOMATED DIFFERENTIAL     Neutrophil 66 %    LYMPH 23 %    MONO 9 %    EOSIN 1 %    BASO 1 %    Percent Immature Granuloctyes 0 %    Absolute Neutrophil 6.2 1.8 - 7.7 K/mcL    Absolute Lymph 2.2 1.0 - 4.0 K/mcL    Absolute Mono 0.8 0.3 - 0.9 K/mcL    Absolute Eos 0.1 0.1 - 0.5 K/mcL    Absolute Baso 0.1 0.0 - 0.3 K/mcL    Absolute Immature Granulocytes 0.0 0 - 0.2 K/mcl   LEVETIRACETAM LEVEL    Collection Time: 07/21/20  4:15 PM   Result Value Ref Range    Levetiracetam 21.2 6.0 - 46.0 mcg/mL   VITAMIN B12 AND FOLATE    Collection Time: 07/21/20  4:15 PM   Result Value Ref Range    B12 446 211 - 911 pg/mL    FOLATE >24.0 >5.4 ng/mL   CBC with Automated Differential    Collection Time: 07/22/20  9:45 AM   Result Value Ref Range    WBC 8.8 4.2 - 11.0 K/mcL    RBC 5.14 4.00 - 5.20 mil/mcL    HGB 17.3 (H) 12.0 - 15.5 g/dL    HCT 51.3 (H) 36.0 - 46.5 %    MCV 99.8 78.0 - 100.0 fl    MCH 33.7 26.0 - 34.0 pg    MCHC 33.7 32.0 - 36.5 g/dL    RDW-CV 15.5 (H) 11.0 - 15.0 %     140 - 450 K/mcL    NRBC 0 0 /100 WBC    DIFF TYPE AUTOMATED DIFFERENTIAL     Neutrophil 74 %    LYMPH 18 %    MONO 6 %    EOSIN 1 %    BASO 1 %    Percent Immature Granuloctyes 0 %    Absolute Neutrophil 6.5 1.8 - 7.7 K/mcL    Absolute Lymph 1.6 1.0 - 4.0 K/mcL    Absolute Mono 0.5 0.3 - 0.9 K/mcL    Absolute Eos 0.1 0.1 - 0.5 K/mcL    Absolute Baso 0.1 0.0 - 0.3 K/mcL    Absolute Immature Granulocytes 0.0 0 - 0.2 K/mcl   Basic Metabolic Panel    Collection Time: 07/22/20  9:45 AM   Result Value Ref Range    Sodium 136 135 - 145 mmol/L    Potassium 2.6 (LL) 3.4 - 5.1 mmol/L    Chloride 91 (L) 98 - 107 mmol/L    Carbon Dioxide 41 (HH) 21 - 32 mmol/L     Anion Gap 7 (L) 10 - 20 mmol/L    Glucose 149 (H) 65 - 99 mg/dL    BUN 4 (L) 6 - 20 mg/dL    Creatinine 0.49 (L) 0.51 - 0.95 mg/dL    GFR Estimate,  >90     GFR Estimate, Non African American >90     BUN/Creatinine Ratio 8 7 - 25    CALCIUM 8.9 8.4 - 10.2 mg/dL   Electrocardiogram 12-Lead    Collection Time: 07/22/20 10:15 AM   Result Value Ref Range    Ventricular Rate EKG/Min (BPM) 78     Atrial Rate (BPM) 80     WI-Interval (MSEC) 204     QRS-Interval (MSEC) 96     QT-Interval (MSEC) 393     QTc 448     P Axis (Degrees) 75     R Axis (Degrees) 41     T Axis (Degrees) 50     REPORT TEXT       Sinus rhythm  Ventricular premature complex  Nonspecific repol abnormality, diffuse leads  Confirmed by CUBA STEPHENSON MD ([id number]) on 7/22/2020 10:38:36 AM     Blood Gas, Venous    Collection Time: 07/22/20 12:35 PM   Result Value Ref Range    PH Venous 7.48 (H) 7.35 - 7.45 Units    PCO2 Venous 62 (H) 38 - 51 mm Hg    PO2 Venous 32 (L) 35 - 42 mm Hg    HCO3 Venous 46 (HH) 22 - 28 mmol/L    Base Excess Venous 18 (H) 0 - 2 mmol/L    sO2 Venous 63 60 - 80 %    fO2HB Venous 58 (L) 60 - 80 %    Temperature 37.0 degrees       Microbiology Results     None           Assessment and Plan    Ms. Rios is a 66 yo F with a PMHx COPD, paroxysmal atrial fibrillation on eliquis, seizure disorder, DVT/PE (2018), breast cancer (2012), and alcohol/tobacco use disorder presenting with fatigue and increased dyspnea on exertion x1 week.     #Fatigue likely secondary to acute on chronic hypokalemia  #Unclear etiology of hypokalemia but likely primary from poor PO intake and GI losses, will need to rule out hypomagnesemia  -Aggressive electrolyte repletion  -BMP, Phos, and Mg levels ordered for @1800  -TSH and Vit D levels ordered for AM to assess for other reversible causes of fatigue    #Dyspnea on exertion likely 2/2 mild exacerbation of COPD  #Metabolic alkalosis in the setting of above  -Not on home O2, continue  supplemental O2 for goal SpO2 88-90%  -S/p prednisone 50mg in ED, continue prednisone 40mg daily x4 more days  -Duonebs q4hrs sheduled  -Continue home breo-ellipta and incruse-ellipta  -No current indication for antibiotics    #Paroxysmal atrial fibrillation  -Continue metoprolol and eliquis  -Check digoxin level in AM given possibility for digoxin toxicity in the setting of hypokalemia  -Will plan to resume digoxin pending digoxin level     #Alcohol use disorder  -Previously admitted 04/2020 for etoh withdrawal  -Patient denies recent etoh use and states last alcohol use was a few weeks ago  -Closely monitor for signs of etoh withdrawal, will order CIWA if concern arises    #Mild polycythemia, appears chronic in the setting of COPD  -Continue to monitor     Chronic:  #Seizure disorder- continue keppra, seizure precautions  #Tobacco use disorder- cessation advised, patient declines nicotine patch during admission  #Depression/anxiety - continue home venlafaxine  #Hx DVT/PE 2018    FEN: no IVF, replete electrolytes aggressively, general diet  DVT ppx: eliquis  Code status: full code, decisional,  is surrogate    PCP: Dr. Octavia Treviño    Discussed with attending Dr. Srivastava. Please await final recommendations.     Leroy Cuevas,   Internal Medicine, PGY-2   or Perfect Serve to reach    7/22/2020 3:06 PM

## 2024-08-08 NOTE — ASSESSMENT
[FreeTextEntry1] : KAYLEY SHORE is a 64 year old man with below --   # long standing AS, overall appears well controlled at present on Enbrel. XR C and LS spine without overt bony changes except for 1 araa of C spine fusion so unlikely related, SI joints normal  # b/l hip moreso OA changes, L most symptomatic but only with certain use  - labs as below including immunosuppression labs: Hepatitis panel, Quantiferon - has order from prior  - imaging reviewed in detail with him  - c/w Enbrel qweekly - c/w HEP as has  at the gym - c/w NSAIDs PRN, can use extra dose and icing directly after activities that exacerbate L hip to see if mitigates resulting pain - discussed local hip injections under US guidance, will defer until closer to pickle ball season which is winter for him   # immunosuppressed status 2/2 steroids and above meds - pt counseled to call if febrile or unwell, counseled to hold rheum medication other than steroids while on Abx   All questions and concerns addressed to my best possible ability, patient verbalizes understanding and is agreeable. RTC 6 months

## 2024-08-08 NOTE — HISTORY OF PRESENT ILLNESS
[FreeTextEntry1] : KAYLEY SHORE is a 64 year old man with hx of AS x 40 years - knees initially but not currently. Hips, SI joints, and neck have been most active, hip arthralgias at present but not limiting ADLs. Has been on Enbrel x years with good response. Not prone to infections on Enbrel, no other SE. Inflammatory arthritis ROS currently negative for symmetrical peripheral joint synovitis, prolonged AM stiffness, enthesitis, dactylitis, psoriasis/ rashes, eye inflammation, inflammatory low back pain, IBD.   Additional PMH -  + FLORY  + hypothyroid  + glaucoma - Dr Bell    Negative FH for autoimmune d/o  --------- 8/7/24 -- L hip worsening pain, difficulty with reaching down, getting out of the car. Walking not limited, playing golf easily, but pickle ball causes delayed hip arthralgia and GTB to point he can't sleep on it at times. NSAIDs x 1 dose daily help with arthralgia, no SE. Continues with weekly Enbrel, no infectious sx. C spine limited ROM stable, no radicular sx. No peripheral synovitis, rashes, ocular or GI involvement.

## 2024-08-08 NOTE — PHYSICAL EXAM
[General Appearance - Alert] : alert [General Appearance - In No Acute Distress] : in no acute distress [General Appearance - Well Nourished] : well nourished [Sclera] : the sclera and conjunctiva were normal [Neck Appearance] : the appearance of the neck was normal [Extraocular Movements] : extraocular movements were intact [Respiration, Rhythm And Depth] : normal respiratory rhythm and effort [Edema] : there was no peripheral edema [No Spinal Tenderness] : no spinal tenderness [Abnormal Walk] : normal gait [Nail Clubbing] : no clubbing  or cyanosis of the fingernails [Musculoskeletal - Swelling] : no joint swelling seen [Motor Tone] : muscle strength and tone were normal [] : no rash [No Focal Deficits] : no focal deficits [Oriented To Time, Place, And Person] : oriented to person, place, and time [Impaired Insight] : insight and judgment were intact [Affect] : the affect was normal [FreeTextEntry1] : No synovitis or effusions, ROM diffusely intact

## 2024-08-08 NOTE — PHYSICAL EXAM
[General Appearance - Alert] : alert [General Appearance - In No Acute Distress] : in no acute distress [General Appearance - Well Nourished] : well nourished [Sclera] : the sclera and conjunctiva were normal [Extraocular Movements] : extraocular movements were intact [Neck Appearance] : the appearance of the neck was normal [Respiration, Rhythm And Depth] : normal respiratory rhythm and effort [Edema] : there was no peripheral edema [No Spinal Tenderness] : no spinal tenderness [Abnormal Walk] : normal gait [Musculoskeletal - Swelling] : no joint swelling seen [Nail Clubbing] : no clubbing  or cyanosis of the fingernails [Motor Tone] : muscle strength and tone were normal [] : no rash [No Focal Deficits] : no focal deficits [Oriented To Time, Place, And Person] : oriented to person, place, and time [Impaired Insight] : insight and judgment were intact [Affect] : the affect was normal [FreeTextEntry1] : No synovitis or effusions, ROM diffusely intact

## 2024-10-01 ENCOUNTER — APPOINTMENT (OUTPATIENT)
Dept: DERMATOLOGY | Facility: CLINIC | Age: 64
End: 2024-10-01
Payer: COMMERCIAL

## 2024-10-01 ENCOUNTER — NON-APPOINTMENT (OUTPATIENT)
Age: 64
End: 2024-10-01

## 2024-10-01 DIAGNOSIS — D22.5 MELANOCYTIC NEVI OF TRUNK: ICD-10-CM

## 2024-10-01 DIAGNOSIS — Z80.8 FAMILY HISTORY OF MALIGNANT NEOPLASM OF OTHER ORGANS OR SYSTEMS: ICD-10-CM

## 2024-10-01 DIAGNOSIS — I77.810 THORACIC AORTIC ECTASIA: ICD-10-CM

## 2024-10-01 DIAGNOSIS — H40.9 UNSPECIFIED GLAUCOMA: ICD-10-CM

## 2024-10-01 DIAGNOSIS — L40.9 PSORIASIS, UNSPECIFIED: ICD-10-CM

## 2024-10-01 DIAGNOSIS — C44.92 SQUAMOUS CELL CARCINOMA OF SKIN, UNSPECIFIED: ICD-10-CM

## 2024-10-01 DIAGNOSIS — L30.8 OTHER SPECIFIED DERMATITIS: ICD-10-CM

## 2024-10-01 DIAGNOSIS — Z86.008 PERSONAL HISTORY OF IN-SITU NEOPLASM OF OTHER SITE: ICD-10-CM

## 2024-10-01 DIAGNOSIS — L91.8 OTHER HYPERTROPHIC DISORDERS OF THE SKIN: ICD-10-CM

## 2024-10-01 DIAGNOSIS — D48.7 NEOPLASM OF UNCERTAIN BEHAVIOR OF OTHER SPECIFIED SITES: ICD-10-CM

## 2024-10-01 DIAGNOSIS — Z86.007 PERSONAL HISTORY OF IN-SITU NEOPLASM OF SKIN: ICD-10-CM

## 2024-10-01 DIAGNOSIS — L57.8 OTHER SKIN CHANGES DUE TO CHRONIC EXPOSURE TO NONIONIZING RADIATION: ICD-10-CM

## 2024-10-01 DIAGNOSIS — L82.1 OTHER SEBORRHEIC KERATOSIS: ICD-10-CM

## 2024-10-01 PROCEDURE — 99203 OFFICE O/P NEW LOW 30 MIN: CPT

## 2024-10-01 RX ORDER — LEVOTHYROXINE SODIUM 0.17 MG/1
TABLET ORAL
Refills: 0 | Status: ACTIVE | COMMUNITY

## 2024-10-01 NOTE — ASSESSMENT
[FreeTextEntry1] : Alert, oriented, well pleasant.  Sun-exposed cutaneous exam. No evidence of cutaneous malignancy. Well healed scars. No evidence of recurrence.         Pink thin nodule. Keloid No treatment. Brown, yellow papules and plaques generalized. Seborrheic keratoses. No treatment. Actinic damage. Reviewed sun protection. Compliant. Brown macules and papules less than 0.6cm generalized especially trunk. Nevi. No treatment.  Right malar cheek o.1cm violaceous dark macule. Not present more than 2 weeks ago. Denies injury.   Neoplasm uncertain behavior. Purpura vs nevus. Photo done. Recheck in 3 weeks. Biopsy discussed.  Follow up 3 weeks and 1 year.   Reviewed physical chart.

## 2024-10-01 NOTE — HISTORY OF PRESENT ILLNESS
[FreeTextEntry1] : new dark spot noted for 2 weeks on right cheek. No previous history. No treatment. 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 Father had melanoma.

## 2024-10-04 ENCOUNTER — NON-APPOINTMENT (OUTPATIENT)
Age: 64
End: 2024-10-04

## 2024-10-22 ENCOUNTER — APPOINTMENT (OUTPATIENT)
Dept: DERMATOLOGY | Facility: CLINIC | Age: 64
End: 2024-10-22

## 2024-12-24 PROBLEM — F10.90 ALCOHOL USE: Status: INACTIVE | Noted: 2017-07-26

## 2025-02-05 ENCOUNTER — APPOINTMENT (OUTPATIENT)
Dept: RHEUMATOLOGY | Facility: CLINIC | Age: 65
End: 2025-02-05

## 2025-02-25 ENCOUNTER — NON-APPOINTMENT (OUTPATIENT)
Age: 65
End: 2025-02-25

## 2025-03-05 ENCOUNTER — APPOINTMENT (OUTPATIENT)
Dept: ORTHOPEDIC SURGERY | Facility: CLINIC | Age: 65
End: 2025-03-05
Payer: COMMERCIAL

## 2025-03-05 ENCOUNTER — NON-APPOINTMENT (OUTPATIENT)
Age: 65
End: 2025-03-05

## 2025-03-05 DIAGNOSIS — M47.816 SPONDYLOSIS W/OUT MYELOPATHY OR RADICULOPATHY, LUMBAR REGION: ICD-10-CM

## 2025-03-05 DIAGNOSIS — M16.0 BILATERAL PRIMARY OSTEOARTHRITIS OF HIP: ICD-10-CM

## 2025-03-05 DIAGNOSIS — M45.9 ANKYLOSING SPONDYLITIS OF UNSPECIFIED SITES IN SPINE: ICD-10-CM

## 2025-03-05 PROCEDURE — 72100 X-RAY EXAM L-S SPINE 2/3 VWS: CPT

## 2025-03-05 PROCEDURE — 99204 OFFICE O/P NEW MOD 45 MIN: CPT

## 2025-03-05 PROCEDURE — 73502 X-RAY EXAM HIP UNI 2-3 VIEWS: CPT | Mod: LT

## 2025-03-05 RX ORDER — DICLOFENAC SODIUM 75 MG/1
75 TABLET, DELAYED RELEASE ORAL
Qty: 60 | Refills: 3 | Status: ACTIVE | COMMUNITY
Start: 2025-03-05 | End: 1900-01-01

## 2025-07-16 ENCOUNTER — APPOINTMENT (OUTPATIENT)
Dept: ORTHOPEDIC SURGERY | Facility: CLINIC | Age: 65
End: 2025-07-16
Payer: COMMERCIAL

## 2025-07-16 PROCEDURE — 99214 OFFICE O/P EST MOD 30 MIN: CPT

## 2025-08-13 ENCOUNTER — OUTPATIENT (OUTPATIENT)
Dept: OUTPATIENT SERVICES | Facility: HOSPITAL | Age: 65
LOS: 1 days | End: 2025-08-13
Payer: COMMERCIAL

## 2025-08-13 ENCOUNTER — APPOINTMENT (OUTPATIENT)
Dept: MRI IMAGING | Facility: HOSPITAL | Age: 65
End: 2025-08-13
Payer: COMMERCIAL

## 2025-08-13 DIAGNOSIS — M16.0 BILATERAL PRIMARY OSTEOARTHRITIS OF HIP: ICD-10-CM

## 2025-08-13 PROCEDURE — 73721 MRI JNT OF LWR EXTRE W/O DYE: CPT | Mod: 26,LT

## 2025-08-13 PROCEDURE — 73721 MRI JNT OF LWR EXTRE W/O DYE: CPT

## 2025-08-18 ENCOUNTER — NON-APPOINTMENT (OUTPATIENT)
Age: 65
End: 2025-08-18